# Patient Record
Sex: FEMALE | Race: WHITE | Employment: OTHER | URBAN - METROPOLITAN AREA
[De-identification: names, ages, dates, MRNs, and addresses within clinical notes are randomized per-mention and may not be internally consistent; named-entity substitution may affect disease eponyms.]

---

## 2017-01-20 DIAGNOSIS — G47.00 INSOMNIA, UNSPECIFIED TYPE: Primary | ICD-10-CM

## 2017-01-23 RX ORDER — TRAZODONE HYDROCHLORIDE 50 MG/1
50-150 TABLET, FILM COATED ORAL
Qty: 90 TABLET | Refills: 1 | Status: SHIPPED | OUTPATIENT
Start: 2017-01-23 | End: 2017-03-03

## 2017-01-23 NOTE — TELEPHONE ENCOUNTER
Prescription approved per [facility] Refill Protocol.  Marcelina Lucas RN    I did call patient and her request to let her know script sent.  I asked her to whenever possible to please call pharmacy for refills instead of clinic.  (I left this msg via Voice mail )      Last OV : 3/31/16

## 2017-02-21 DIAGNOSIS — E78.5 HYPERLIPIDEMIA LDL GOAL <70: ICD-10-CM

## 2017-02-21 NOTE — TELEPHONE ENCOUNTER
Reason for Call:  Medication or medication refill:    Do you use a [facility]?  Name of the pharmacy and phone number for the current request: Pharmacy: [facility] [address] [place] [address] AT [address] [Patient Preferred]  [phone]    Name of the medication requested: simvastatin (ZOCOR) 40 MG tablet    Other request: Pt is taking her last dose tonight    Can we leave a detailed message on this number? YES    Phone number patient can be reached at: Home number on file [phone] (home)    Best Time: anytime    Call taken on 2/21/2017 at 4:39 PM by Rekha Santillan

## 2017-02-22 RX ORDER — SIMVASTATIN 40 MG
40 TABLET ORAL AT BEDTIME
Qty: 30 TABLET | Refills: 0 | Status: SHIPPED | OUTPATIENT
Start: 2017-02-22 | End: 2017-04-06

## 2017-02-22 NOTE — TELEPHONE ENCOUNTER
simvastatin   LDL is due.  Ordered.  Filled for one month.  DOMINICK Pate    Last Written Prescription Date: 7/11/16  Last Fill Quantity: 30, # refills: 1  Last Office Visit with G, P or [facility] prescribing provider: 3/31/16       Lab Results   Component Value Date    CHOL 188 12/07/2015     Lab Results   Component Value Date    HDL 80 12/07/2015     Lab Results   Component Value Date    LDL 96 12/07/2015     Lab Results   Component Value Date    TRIG 62 12/07/2015     Lab Results   Component Value Date    CHOLHDLRATIO 1.7 08/06/2014       Informed pt and made lab appt.  DOMINICK Pate

## 2017-02-28 ENCOUNTER — ALLIED HEALTH/NURSE VISIT (OUTPATIENT)
Dept: NURSING | Facility: CLINIC | Age: 72
End: 2017-02-28
Payer: MEDICARE

## 2017-02-28 VITALS — BODY MASS INDEX: 17.67 KG/M2 | WEIGHT: 90.5 LBS

## 2017-02-28 DIAGNOSIS — E78.5 HYPERLIPIDEMIA LDL GOAL <70: ICD-10-CM

## 2017-02-28 DIAGNOSIS — R63.6 UNDERWEIGHT: Primary | ICD-10-CM

## 2017-02-28 PROCEDURE — 80061 LIPID PANEL: CPT | Performed by: FAMILY MEDICINE

## 2017-02-28 PROCEDURE — 99207 ZZC NO CHARGE NURSE ONLY: CPT

## 2017-02-28 PROCEDURE — 36415 COLL VENOUS BLD VENIPUNCTURE: CPT | Performed by: FAMILY MEDICINE

## 2017-02-28 NOTE — MR AVS SNAPSHOT
"              After Visit Summary   2/28/2017    Digna Montana    MRN: [id number]           Patient Information     Date Of Birth          1945        Visit Information        Provider Department      2/28/2017 2:00 PM  MEDICAL ASSISTANT [facility]        Today's Diagnoses     Underweight    -  1       Follow-ups after your visit        Your next 10 appointments already scheduled     Feb 28, 2017  2:00 PM CST   Nurse Only with  MEDICAL ASSISTANT   [facility] ([facility]    [address] [place] [id number] [phone]              Who to contact     If you have questions or need follow up information about today's clinic visit or your schedule please contact [facility] directly at [phone].  Normal or non-critical lab and imaging results will be communicated to you by MyChart, letter or phone within 4 business days after the clinic has received the results. If you do not hear from us within 7 days, please contact the clinic through MyChart or phone. If you have a critical or abnormal lab result, we will notify you by phone as soon as possible.  Submit refill requests through [employer] or call your pharmacy and they will forward the refill request to us. Please allow 3 business days for your refill to be completed.          Additional Information About Your Visit        MyChart Information     [employer] lets you send messages to your doctor, view your test results, renew your prescriptions, schedule appointments and more. To sign up, go to [email or web address] . Click on \"Log in\" on the left side of the screen, which will take you to the Welcome page. Then click on \"Sign up Now\" on the right side of the page.     You will be asked to enter the access code listed below, as well as some personal information. Please follow the directions to create your username and password.     Your access code is: [email or web address]  Expires: " 2017  1:29 PM     Your access code will  in 90 days. If you need help or a new code, please call your [facility] clinic or [phone].        Care EveryWhere ID     This is your Care EveryWhere ID. This could be used by other organizations to access your [facility] medical records  [id number]        Your Vitals Were     BMI (Body Mass Index)                   17.67 kg/m2            Blood Pressure from Last 3 Encounters:   16 134/72   12/05/15 137/89   09/01/15 124/71    Weight from Last 3 Encounters:   17 90 lb 8 oz (41.1 kg)   16 88 lb (39.9 kg)   12/05/15 95 lb (43.1 kg)              Today, you had the following     No orders found for display       Primary Care Provider Office Phone # Fax #    Simon Jesus Madden [phone]       [address] [address]        Thank you!     Thank you for choosing [facility]  for your care. Our goal is always to provide you with excellent care. Hearing back from our patients is one way we can continue to improve our services. Please take a few minutes to complete the written survey that you may receive in the mail after your visit with us. Thank you!             Your Updated Medication List - Protect others around you: Learn how to safely use, store and throw away your medicines at www.disposemymeds.org.          This list is accurate as of: 17  1:29 PM.  Always use your most recent med list.                   Brand Name Dispense Instructions for use    acetaminophen 325 MG tablet    TYLENOL    50 tablet    Take 1-2 tablets by mouth every 6 hours as needed.       ADVIL PO          * albuterol 108 (90 BASE) MCG/ACT Inhaler    PROAIR HFA/PROVENTIL HFA/VENTOLIN HFA    1 Inhaler    Inhale 2 puffs into the lungs every 4 hours as needed for shortness of breath / dyspnea       * albuterol (2.5 MG/3ML) 0.083% neb solution     1 Box    ONE NEBULIZATION 4 TIMES DAILY AS NEEDED        amLODIPine 5 MG tablet    NORVASC    90 tablet    Take 1 tablet (5 mg) by mouth daily       aspirin 325 MG EC tablet     100 tablet    Take 1 tablet (325 mg) by mouth daily       buPROPion 150 MG 12 hr tablet    BUPROBAN    180 tablet    Take 1 tablet (150 mg) by mouth 2 times daily       fluticasone 50 MCG/ACT spray    FLONASE    16 g    Spray 2 sprays into both nostrils daily       loratadine 10 MG tablet    CLARITIN    90 tablet    Take 1 tablet by mouth daily.       omeprazole 20 MG tablet     90 tablet    Take 1 tablet (20 mg) by mouth daily       simvastatin 40 MG tablet    ZOCOR    30 tablet    Take 1 tablet (40 mg) by mouth At Bedtime       SYMBICORT 160-4.5 MCG/ACT Inhaler   Generic drug:  budesonide-formoterol     3 Inhaler    Inhale 2 puffs into the lungs 2 times daily       traZODone 50 MG tablet    DESYREL    90 tablet    Take 1-3 tablets ( mg) by mouth nightly as needed for sleep       * Notice:  This list has 2 medication(s) that are the same as other medications prescribed for you. Read the directions carefully, and ask your doctor or other care provider to review them with you.

## 2017-02-28 NOTE — NURSING NOTE
Chief Complaint   Patient presents with     Allied Health Visit     wgt check       Initial Wt 90 lb 8 oz (41.1 kg)  BMI 17.67 kg/m2 Estimated body mass index is 17.67 kg/(m^2) as calculated from the following:    Height as of 12/5/15: 5' (1.524 m).    Weight as of this encounter: 90 lb 8 oz (41.1 kg).  Medication Reconciliation: unable or not appropriate to perform     Nou Felipe, CMA

## 2017-03-01 LAB
CHOLEST SERPL-MCNC: 187 MG/DL
HDLC SERPL-MCNC: 76 MG/DL
LDLC SERPL CALC-MCNC: 91 MG/DL
NONHDLC SERPL-MCNC: 111 MG/DL
TRIGL SERPL-MCNC: 98 MG/DL

## 2017-03-03 DIAGNOSIS — G47.00 INSOMNIA, UNSPECIFIED TYPE: ICD-10-CM

## 2017-03-03 NOTE — TELEPHONE ENCOUNTER
Medication Detail      Disp Refills Start End [facility]   traZODone (DESYREL) 50 MG tablet 90 tablet 1 1/23/2017  No   Sig: Take 1-3 tablets ( mg) by mouth nightly as needed for sleep       Last Office Visit with [facility], [facility] or [facility] prescribing provider:  6/21/2016        Last PHQ-9 score on record=   PHQ-9 SCORE 3/31/2016   Total Score -   Total Score 4       Lab Results   Component Value Date    AST 22 08/19/2015     Lab Results   Component Value Date    ALT 23 08/19/2015

## 2017-03-06 RX ORDER — TRAZODONE HYDROCHLORIDE 50 MG/1
TABLET, FILM COATED ORAL
Qty: 135 TABLET | Refills: 2 | Status: SHIPPED | OUTPATIENT
Start: 2017-03-06 | End: 2017-04-13

## 2017-03-16 ENCOUNTER — RADIANT APPOINTMENT (OUTPATIENT)
Dept: GENERAL RADIOLOGY | Facility: CLINIC | Age: 72
End: 2017-03-16
Attending: FAMILY MEDICINE
Payer: MEDICARE

## 2017-03-16 ENCOUNTER — OFFICE VISIT (OUTPATIENT)
Dept: FAMILY MEDICINE | Facility: CLINIC | Age: 72
End: 2017-03-16
Payer: MEDICARE

## 2017-03-16 VITALS
SYSTOLIC BLOOD PRESSURE: 118 MMHG | HEIGHT: 59 IN | OXYGEN SATURATION: 98 % | HEART RATE: 72 BPM | TEMPERATURE: 98.3 F | RESPIRATION RATE: 14 BRPM | WEIGHT: 89 LBS | DIASTOLIC BLOOD PRESSURE: 60 MMHG | BODY MASS INDEX: 17.94 KG/M2

## 2017-03-16 DIAGNOSIS — R05.9 COUGH: ICD-10-CM

## 2017-03-16 DIAGNOSIS — J44.1 CHRONIC OBSTRUCTIVE PULMONARY DISEASE WITH ACUTE EXACERBATION (H): Primary | ICD-10-CM

## 2017-03-16 DIAGNOSIS — F17.200 TOBACCO USE DISORDER: ICD-10-CM

## 2017-03-16 DIAGNOSIS — F10.29 ALCOHOL DEPENDENCE WITH UNSPECIFIED ALCOHOL-INDUCED DISORDER (H): ICD-10-CM

## 2017-03-16 DIAGNOSIS — I10 HYPERTENSION GOAL BP (BLOOD PRESSURE) < 140/90: ICD-10-CM

## 2017-03-16 PROCEDURE — 99214 OFFICE O/P EST MOD 30 MIN: CPT | Performed by: FAMILY MEDICINE

## 2017-03-16 PROCEDURE — 71020 XR CHEST 2 VW: CPT

## 2017-03-16 RX ORDER — AZITHROMYCIN 250 MG/1
TABLET, FILM COATED ORAL
Qty: 6 TABLET | Refills: 0 | Status: SHIPPED | OUTPATIENT
Start: 2017-03-16 | End: 2017-06-19

## 2017-03-16 RX ORDER — PREDNISONE 20 MG/1
40 TABLET ORAL DAILY
Qty: 10 TABLET | Refills: 0 | Status: SHIPPED | OUTPATIENT
Start: 2017-03-16 | End: 2017-03-21

## 2017-03-16 ASSESSMENT — ANXIETY QUESTIONNAIRES
1. FEELING NERVOUS, ANXIOUS, OR ON EDGE: NOT AT ALL
GAD7 TOTAL SCORE: 0
7. FEELING AFRAID AS IF SOMETHING AWFUL MIGHT HAPPEN: NOT AT ALL
2. NOT BEING ABLE TO STOP OR CONTROL WORRYING: NOT AT ALL
5. BEING SO RESTLESS THAT IT IS HARD TO SIT STILL: NOT AT ALL
3. WORRYING TOO MUCH ABOUT DIFFERENT THINGS: NOT AT ALL
6. BECOMING EASILY ANNOYED OR IRRITABLE: NOT AT ALL

## 2017-03-16 ASSESSMENT — PATIENT HEALTH QUESTIONNAIRE - PHQ9: 5. POOR APPETITE OR OVEREATING: NOT AT ALL

## 2017-03-16 NOTE — LETTER
[facility]   [address] [place] [address]  [phone]      March 22, 2017      Digna Montana  [address]   [facility] [address]          Dear Ms. Montana,    The results of your recent lab tests were within normal limits. Enclosed is a copy of these results.  If you have any further questions or problems, please contact our office.      Results for orders placed or performed in visit on 03/16/17   XR Chest 2 Views    Narrative    XR CHEST 2 VW 3/16/2017 10:55 AM    COMPARISON: 8/19/2015    HISTORY: Cough      Impression    IMPRESSION: Cardiac silhouette and pulmonary vasculature are within  normal limits. No focal airspace disease, pleural effusion or  pneumothorax.    GUILLERMINA PEREZ       Sincerely,      Adalgisa Tapia MD/nr

## 2017-03-16 NOTE — PROGRESS NOTES
SUBJECTIVE:                                                    Digna Montana is a 71 year old female who presents to clinic today for the following health issues:    Acute Illness   Acute illness concerns: pneumonia  Onset: 2-3 weeks     Fever: no    Chills/Sweats: no    Headache (location?): no    Sinus Pressure:YES- while coughing     Conjunctivitis:  no    Ear Pain: no    Rhinorrhea: no    Congestion: YES    Sore Throat: no     Cough: YES-productive of yellow sputum    Wheeze: no    Decreased Appetite: YES    Nausea: no    Vomiting: no    Diarrhea:  no    Dysuria/Freq.: no    Fatigue/Achiness: YES    Sick/Strep Exposure: no     Therapies Tried and outcome: Cough Syrup       Has been constantly coughing with coughing spells during the day. Generally has this cough once a year and has had pneumonia in the past. The cough is not worsening.  Denies fever. She has two inhalers and is using her albuterol more with the cough, offering relief.     She has cut back on smoking. Generally just takes drags off one cigarette through the am. Then goes outside to smoke in the afternoon. Really only smoking 3 cigarettes a day.    Patient has also cut back on drinking from her previous habits.       Problem list and histories reviewed & adjusted, as indicated.  Additional history: as documented    Patient Active Problem List   Diagnosis     Allergic rhinitis     Tobacco use disorder     Insomnia     Major depressive disorder, recurrent episode, moderate     Esophageal reflux     Generalized osteoarthrosis, unspecified site     COPD (chronic obstructive pulmonary disease) (H)     Stress reaction of bone     Back pain     Hiatal hernia     Advanced directives, counseling/discussion     Anxiety     CARDIOVASCULAR SCREENING; LDL GOAL LESS THAN 70     Acute right MCA stroke (H)     Intracranial vascular stenosis     Carotid disease, bilateral (H)     Mixed hyperlipidemia     Post herpetic neuralgia     Alcohol dependence (H)      Hypertension goal BP (blood pressure) < 140/90     Past Surgical History   Procedure Laterality Date     C nonspecific procedure            C nonspecific procedure       cholecystectomy     C nonspecific procedure       left distal radius fx       Social History   Substance Use Topics     Smoking status: Current Every Day Smoker     Types: Cigarettes     Smokeless tobacco: Current User      Comment: is using patch/quit plan     Alcohol use Yes      Comment: 2 drinks 3 times weekly     Family History   Problem Relation Age of Onset     GASTROINTESTINAL DISEASE Mother      pancreatitis     C.A.D. Father      mi in 70's     Genetic Disorder Sister      downs     DIABETES No family hx of      Hypertension No family hx of      CEREBROVASCULAR DISEASE No family hx of      Breast Cancer No family hx of      Cancer - colorectal No family hx of      Prostate Cancer No family hx of          Current Outpatient Prescriptions   Medication Sig Dispense Refill     traZODone (DESYREL) 50 MG tablet TAKE 1 TO 3 TABLETS(50  MG) BY MOUTH EVERY NIGHT AS NEEDED FOR SLEEP 135 tablet 2     simvastatin (ZOCOR) 40 MG tablet Take 1 tablet (40 mg) by mouth At Bedtime 30 tablet 0     buPROPion (BUPROBAN) 150 MG 12 hr tablet Take 1 tablet (150 mg) by mouth 2 times daily 180 tablet 0     amLODIPine (NORVASC) 5 MG tablet Take 1 tablet (5 mg) by mouth daily 90 tablet 3     fluticasone (FLONASE) 50 MCG/ACT nasal spray Spray 2 sprays into both nostrils daily 16 g 6     Ibuprofen (ADVIL PO)        albuterol (PROAIR HFA, PROVENTIL HFA, VENTOLIN HFA) 108 (90 BASE) MCG/ACT inhaler Inhale 2 puffs into the lungs every 4 hours as needed for shortness of breath / dyspnea 1 Inhaler 2     albuterol (2.5 MG/3ML) 0.083% nebulizer solution ONE NEBULIZATION 4 TIMES DAILY AS NEEDED 1 Box 0     omeprazole 20 MG tablet Take 1 tablet (20 mg) by mouth daily 90 tablet 2     aspirin 325 MG EC tablet Take 1 tablet (325 mg) by mouth daily 100 tablet  1     budesonide-formoterol (SYMBICORT) 160-4.5 MCG/ACT inhaler Inhale 2 puffs into the lungs 2 times daily 3 Inhaler 1     loratadine (CLARITIN) 10 MG tablet Take 1 tablet by mouth daily.    90 tablet 3     acetaminophen (TYLENOL) 325 MG tablet Take 1-2 tablets by mouth every 6 hours as needed. 50 tablet prn     Allergies   Allergen Reactions     Penicillins      Throat swelled up      Recent Labs   Lab Test  02/28/17   1133  12/07/15   0747  08/19/15   1416  08/28/14   1030  08/06/14   1147  12/21/13   1723   02/15/12   1205   A1C   --    --    --    --    --   5.2   --    --    LDL  91  96   --    --   44  132*   --    --    HDL  76  80   --    --   79  87   --    --    TRIG  98  62   --    --   54  128   --    --    ALT   --    --   23   --   13   --    --   18   CR   --    --   0.70  0.66  0.76  0.66   < >  0.79   GFRESTIMATED   --    --   82  88  76  89   < >  73   GFRESTBLACK   --    --   >90   GFR Calc    >90   GFR Calc    >90   GFR Calc    >90   < >  88   POTASSIUM   --    --   3.8   --   4.5  3.1*   < >  3.8    < > = values in this interval not displayed.      BP Readings from Last 3 Encounters:   03/16/17 118/60   03/31/16 134/72   12/05/15 137/89    Wt Readings from Last 3 Encounters:   03/16/17 40.4 kg (89 lb)   02/28/17 41.1 kg (90 lb 8 oz)   03/31/16 39.9 kg (88 lb)         Reviewed and updated as needed this visit by clinical staff  Reviewed and updated as needed this visit by Provider    ROS:  SOB following coughing spell. See above.     This document serves as a record of the services and decisions personally performed and made by Adalgisa Tapia MD. It was created on his/her behalf by Shirin Coronado, trained medical scribe. The creation of this document is based the provider's statements to the medical scribes.    Scribe Shirin Coronado, March 16, 2017  OBJECTIVE:                                                    /60  Pulse 72  Temp 98.3  F  "(36.8  C) (Oral)  Resp 14  Ht 1.486 m (4' 10.5\")  Wt 40.4 kg (89 lb)  SpO2 98%  BMI 18.28 kg/m2  Body mass index is 18.28 kg/(m^2).  GENERAL: alert and no distress  HENT: ear canals and TM's normal, nose and mouth without ulcers or lesions  NECK: no adenopathy, no asymmetry, masses, or scars and thyroid normal to palpation  RESP: lungs clear to auscultation - no rales or rhonchi. Some wheezes  CV: regular rate and rhythm, normal S1 S2, no S3 or S4, no murmur, click or rub    Diagnostic Test Results:  Chest XR - no pneumonia seen upon my initial read, awaiting radiology overread      ASSESSMENT/PLAN:                                                    1. Chronic obstructive pulmonary disease with acute exacerbation (H)  Prescribed azithromycin and prednisone for COPD flare.   - azithromycin (ZITHROMAX) 250 MG tablet; Two tablets first day, then one tablet daily for four days.  Dispense: 6 tablet; Refill: 0  - predniSONE (DELTASONE) 20 MG tablet; Take 2 tablets (40 mg) by mouth daily for 5 days  Dispense: 10 tablet; Refill: 0    2. Cough  No evidence of pneumonia seen upon my initial read, awaiting radiology overread. She continues to use her albuterol and symbicort inhalers PRN with the cough.   - XR Chest 2 Views; Future    3. Alcohol dependence with unspecified alcohol-induced disorder (H)  She has cut back on drinking.    4. Tobacco use disorder  Not interested in quitting but has cut back to roughly three cigarettes a day.    5. Hypertension goal BP (blood pressure) < 140/90  Controlled. No changes today       Patient Instructions     1. Start azithromycin and prednisone for five days.  2. Call if symptoms are getting worse or not getting better.    COPD Flare    You have had a flare-up of your COPD.  COPD, or chronic obstructive pulmonary disease, is a common lung disease. It causes your airways to become irritated and narrower. This makes it harder for you to breathe. Emphysema and chronic bronchitis are both " types of COPD. This is a chronic condition, which means you always have it. Sometimes it gets worse. When this happens, it is called a flare-up.  Symptoms of COPD  People with COPD may have symptoms most of the time. In a flare-up, your symptoms get worse. These symptoms may mean you are having a flare-up:    Shortness of breath, shallow or rapid breathing, or wheezing that gets worse    Lung infection    Cough that gets worse    More mucus, thicker mucus or mucus of a different color    Tiredness, decreased energy, or trouble doing your usual activities    Fever    Chest tightness    Your symptoms don t get better even when you use your usual medicines, inhalers, and nebulizer    Trouble talking    You feel confused  Causes of flare-ups  Unfortunately, a flare-up can happen even though you did everything right, and you followed your doctor s instructions. Some causes of flare-ups are:    Smoking or secondhand smoke    Colds, the flu, or respiratory infections    Air pollution    Sudden change in the weather    Dust, irritating chemicals, or strong fumes    Not taking your medicines as prescribed  Home care  Here are some things you can do at home to treat a flare-up:    Try not to panic. This makes it harder to breathe, and keeps you from doing the right things.    Don t smoke or be around others who are smoking.    Try to drink more fluids than usual during a flare-up, unless your doctor has told you not to because of heart and kidney problems. More fluids can help loosen the mucus.    Use your inhalers and nebulizer, if you have one, as you have been told to.    If you were given antibiotics, take them until they are used up or your doctor tells you to stop. It s important to finish the antibiotics, even though you feel better. This will make sure the infection has cleared.    If you were given prednisone or another steroid, finish it even if you feel better.  Preventing a flare-up  Even though flare-ups happen,  the best way to treat one is to prevent it before it starts. Here are some pointers:    Don t smoke or be around others who are smoking.    Take your medicines as you have been told.    Talk with your doctor about getting a flu shot every year. Also find out if you need a pneumonia shot.    If there is a weather advisory warning to stay indoors, try to stay inside when possible.    Try to eat healthy and get plenty of sleep.    Try to avoid things that usually set you off, like dust, chemical fumes, hairsprays, or strong perfumes.  Follow-up care  Follow up with your healthcare provider.  If a culture was done, you will be told if your treatment needs to be changed. You can call as directed for the results.  If X-rays were done, and a radiologist had not seen them while you were there, they will be reviewed. You will be told if there is a change in the reading, especially if it affects your treatment.  Call 911  Call 911 if any of these occur:    You have trouble breathing    You feel confused or it s difficult to wake you up    You faint or lose consciousness    You have a rapid heart rate    You have new pain in your chest, arm, shoulder, neck or upper back  When to seek medical advice  Call your healthcare provider right away if any of these occur:    Wheezing or shortness of breath gets worse    You need to use your inhalers more often than usual without relief    Fever of 100.4 F (38 C) or higher, or as directed    Coughing up lots of dark-colored or bloody sputum (mucus)    Chest pain with each breath    You do not start to get better within 24 hours    Swelling or your ankles gets worse    Dizziness or weakness       1493-7964 [employer]. [address] [place] [address], [place] [address]. All rights reserved. This information is not intended as a substitute for professional medical care. Always follow your healthcare professional's instructions.            The information in this document, created by  the medical scribe for me, accurately reflects the services I personally performed and the decisions made by me. I have reviewed and approved this document for accuracy prior to leaving the patient care area. 03/16/17    Adalgisa Tapia MD  [facility]

## 2017-03-16 NOTE — MR AVS SNAPSHOT
After Visit Summary   3/16/2017    Digna Montana    MRN: [id number]           Patient Information     Date Of Birth          1945        Visit Information        Provider Department      3/16/2017 10:00 AM Adalgisa Tapia MD [facility]        Today's Diagnoses     Chronic obstructive pulmonary disease with acute exacerbation (H)    -  1    Cough        Alcohol dependence with unspecified alcohol-induced disorder (H)        Tobacco use disorder        Hypertension goal BP (blood pressure) < 140/90          Care Instructions      1. Start azithromycin and prednisone for five days.  2. Call if symptoms are getting worse or not getting better.    COPD Flare    You have had a flare-up of your COPD.  COPD, or chronic obstructive pulmonary disease, is a common lung disease. It causes your airways to become irritated and narrower. This makes it harder for you to breathe. Emphysema and chronic bronchitis are both types of COPD. This is a chronic condition, which means you always have it. Sometimes it gets worse. When this happens, it is called a flare-up.  Symptoms of COPD  People with COPD may have symptoms most of the time. In a flare-up, your symptoms get worse. These symptoms may mean you are having a flare-up:    Shortness of breath, shallow or rapid breathing, or wheezing that gets worse    Lung infection    Cough that gets worse    More mucus, thicker mucus or mucus of a different color    Tiredness, decreased energy, or trouble doing your usual activities    Fever    Chest tightness    Your symptoms don t get better even when you use your usual medicines, inhalers, and nebulizer    Trouble talking    You feel confused  Causes of flare-ups  Unfortunately, a flare-up can happen even though you did everything right, and you followed your doctor s instructions. Some causes of flare-ups are:    Smoking or secondhand smoke    Colds, the flu, or respiratory infections    Air  pollution    Sudden change in the weather    Dust, irritating chemicals, or strong fumes    Not taking your medicines as prescribed  Home care  Here are some things you can do at home to treat a flare-up:    Try not to panic. This makes it harder to breathe, and keeps you from doing the right things.    Don t smoke or be around others who are smoking.    Try to drink more fluids than usual during a flare-up, unless your doctor has told you not to because of heart and kidney problems. More fluids can help loosen the mucus.    Use your inhalers and nebulizer, if you have one, as you have been told to.    If you were given antibiotics, take them until they are used up or your doctor tells you to stop. It s important to finish the antibiotics, even though you feel better. This will make sure the infection has cleared.    If you were given prednisone or another steroid, finish it even if you feel better.  Preventing a flare-up  Even though flare-ups happen, the best way to treat one is to prevent it before it starts. Here are some pointers:    Don t smoke or be around others who are smoking.    Take your medicines as you have been told.    Talk with your doctor about getting a flu shot every year. Also find out if you need a pneumonia shot.    If there is a weather advisory warning to stay indoors, try to stay inside when possible.    Try to eat healthy and get plenty of sleep.    Try to avoid things that usually set you off, like dust, chemical fumes, hairsprays, or strong perfumes.  Follow-up care  Follow up with your healthcare provider.  If a culture was done, you will be told if your treatment needs to be changed. You can call as directed for the results.  If X-rays were done, and a radiologist had not seen them while you were there, they will be reviewed. You will be told if there is a change in the reading, especially if it affects your treatment.  Call 911  Call 911 if any of these occur:    You have trouble  "breathing    You feel confused or it s difficult to wake you up    You faint or lose consciousness    You have a rapid heart rate    You have new pain in your chest, arm, shoulder, neck or upper back  When to seek medical advice  Call your healthcare provider right away if any of these occur:    Wheezing or shortness of breath gets worse    You need to use your inhalers more often than usual without relief    Fever of 100.4 F (38 C) or higher, or as directed    Coughing up lots of dark-colored or bloody sputum (mucus)    Chest pain with each breath    You do not start to get better within 24 hours    Swelling or your ankles gets worse    Dizziness or weakness       6490-7638 [employer] [address] [place] [address]. All rights reserved. This information is not intended as a substitute for professional medical care. Always follow your healthcare professional's instructions.              Follow-ups after your visit        Who to contact     If you have questions or need follow up information about today's clinic visit or your schedule please contact [facility] directly at [phone].  Normal or non-critical lab and imaging results will be communicated to you by Playbasishart, letter or phone within 4 business days after the clinic has received the results. If you do not hear from us within 7 days, please contact the clinic through NoteVaultt or phone. If you have a critical or abnormal lab result, we will notify you by phone as soon as possible.  Submit refill requests through Solutionary or call your pharmacy and they will forward the refill request to us. Please allow 3 business days for your refill to be completed.          Additional Information About Your Visit        Solutionary Information     Solutionary lets you send messages to your doctor, view your test results, renew your prescriptions, schedule appointments and more. To sign up, go to [email or web address] . Click on \"Log in\" " "on the left side of the screen, which will take you to the Welcome page. Then click on \"Sign up Now\" on the right side of the page.     You will be asked to enter the access code listed below, as well as some personal information. Please follow the directions to create your username and password.     Your access code is: [email or web address]  Expires: 2017  2:29 PM     Your access code will  in 90 days. If you need help or a new code, please call your [facility] or [phone].        Care EveryWhere ID     This is your Care EveryWhere ID. This could be used by other organizations to access your [facility] medical records  [id number]        Your Vitals Were     Pulse Temperature Respirations Height Pulse Oximetry BMI (Body Mass Index)    72 98.3  F (36.8  C) (Oral) 14 4' 10.5\" (1.486 m) 98% 18.28 kg/m2       Blood Pressure from Last 3 Encounters:   17 118/60   16 134/72   12/05/15 137/89    Weight from Last 3 Encounters:   17 89 lb (40.4 kg)   17 90 lb 8 oz (41.1 kg)   16 88 lb (39.9 kg)                 Today's Medication Changes          These changes are accurate as of: 3/16/17 11:08 AM.  If you have any questions, ask your nurse or doctor.               Start taking these medicines.        Dose/Directions    azithromycin 250 MG tablet   Commonly known as:  ZITHROMAX   Used for:  Chronic obstructive pulmonary disease with acute exacerbation (H)   Started by:  Adalgisa Tapia MD        Two tablets first day, then one tablet daily for four days.   Quantity:  6 tablet   Refills:  0       predniSONE 20 MG tablet   Commonly known as:  DELTASONE   Used for:  Chronic obstructive pulmonary disease with acute exacerbation (H)   Started by:  Adalgisa Tapia MD        Dose:  40 mg   Take 2 tablets (40 mg) by mouth daily for 5 days   Quantity:  10 tablet   Refills:  0            Where to get your medicines      These medications were sent to [facility] [address] [place] [address] "  [address] [place] [address] [address] [facility] [address]     Phone:  [phone]     azithromycin 250 MG tablet    predniSONE 20 MG tablet                Primary Care Provider Office Phone # Fax #    Simon Jesus Madden [phone]       [facility] [address] [facility] [address]        Thank you!     Thank you for choosing [facility]  for your care. Our goal is always to provide you with excellent care. Hearing back from our patients is one way we can continue to improve our services. Please take a few minutes to complete the written survey that you may receive in the mail after your visit with us. Thank you!             Your Updated Medication List - Protect others around you: Learn how to safely use, store and throw away your medicines at www.disposemymeds.org.          This list is accurate as of: 3/16/17 11:08 AM.  Always use your most recent med list.                   Brand Name Dispense Instructions for use    acetaminophen 325 MG tablet    TYLENOL    50 tablet    Take 1-2 tablets by mouth every 6 hours as needed.       ADVIL PO          * albuterol 108 (90 BASE) MCG/ACT Inhaler    PROAIR HFA/PROVENTIL HFA/VENTOLIN HFA    1 Inhaler    Inhale 2 puffs into the lungs every 4 hours as needed for shortness of breath / dyspnea       * albuterol (2.5 MG/3ML) 0.083% neb solution     1 Box    ONE NEBULIZATION 4 TIMES DAILY AS NEEDED       amLODIPine 5 MG tablet    NORVASC    90 tablet    Take 1 tablet (5 mg) by mouth daily       aspirin 325 MG EC tablet     100 tablet    Take 1 tablet (325 mg) by mouth daily       azithromycin 250 MG tablet    ZITHROMAX    6 tablet    Two tablets first day, then one tablet daily for four days.       buPROPion 150 MG 12 hr tablet    BUPROBAN    180 tablet    Take 1 tablet (150 mg) by mouth 2 times daily       fluticasone 50 MCG/ACT spray    FLONASE    16 g    Spray 2 sprays into both nostrils daily        loratadine 10 MG tablet    CLARITIN    90 tablet    Take 1 tablet by mouth daily.       omeprazole 20 MG tablet     90 tablet    Take 1 tablet (20 mg) by mouth daily       predniSONE 20 MG tablet    DELTASONE    10 tablet    Take 2 tablets (40 mg) by mouth daily for 5 days       simvastatin 40 MG tablet    ZOCOR    30 tablet    Take 1 tablet (40 mg) by mouth At Bedtime       SYMBICORT 160-4.5 MCG/ACT Inhaler   Generic drug:  budesonide-formoterol     3 Inhaler    Inhale 2 puffs into the lungs 2 times daily       traZODone 50 MG tablet    DESYREL    135 tablet    TAKE 1 TO 3 TABLETS(50  MG) BY MOUTH EVERY NIGHT AS NEEDED FOR SLEEP       * Notice:  This list has 2 medication(s) that are the same as other medications prescribed for you. Read the directions carefully, and ask your doctor or other care provider to review them with you.

## 2017-03-16 NOTE — PATIENT INSTRUCTIONS
1. Start azithromycin and prednisone for five days.  2. Call if symptoms are getting worse or not getting better.    COPD Flare    You have had a flare-up of your COPD.  COPD, or chronic obstructive pulmonary disease, is a common lung disease. It causes your airways to become irritated and narrower. This makes it harder for you to breathe. Emphysema and chronic bronchitis are both types of COPD. This is a chronic condition, which means you always have it. Sometimes it gets worse. When this happens, it is called a flare-up.  Symptoms of COPD  People with COPD may have symptoms most of the time. In a flare-up, your symptoms get worse. These symptoms may mean you are having a flare-up:    Shortness of breath, shallow or rapid breathing, or wheezing that gets worse    Lung infection    Cough that gets worse    More mucus, thicker mucus or mucus of a different color    Tiredness, decreased energy, or trouble doing your usual activities    Fever    Chest tightness    Your symptoms don t get better even when you use your usual medicines, inhalers, and nebulizer    Trouble talking    You feel confused  Causes of flare-ups  Unfortunately, a flare-up can happen even though you did everything right, and you followed your doctor s instructions. Some causes of flare-ups are:    Smoking or secondhand smoke    Colds, the flu, or respiratory infections    Air pollution    Sudden change in the weather    Dust, irritating chemicals, or strong fumes    Not taking your medicines as prescribed  Home care  Here are some things you can do at home to treat a flare-up:    Try not to panic. This makes it harder to breathe, and keeps you from doing the right things.    Don t smoke or be around others who are smoking.    Try to drink more fluids than usual during a flare-up, unless your doctor has told you not to because of heart and kidney problems. More fluids can help loosen the mucus.    Use your inhalers and nebulizer, if you have one,  as you have been told to.    If you were given antibiotics, take them until they are used up or your doctor tells you to stop. It s important to finish the antibiotics, even though you feel better. This will make sure the infection has cleared.    If you were given prednisone or another steroid, finish it even if you feel better.  Preventing a flare-up  Even though flare-ups happen, the best way to treat one is to prevent it before it starts. Here are some pointers:    Don t smoke or be around others who are smoking.    Take your medicines as you have been told.    Talk with your doctor about getting a flu shot every year. Also find out if you need a pneumonia shot.    If there is a weather advisory warning to stay indoors, try to stay inside when possible.    Try to eat healthy and get plenty of sleep.    Try to avoid things that usually set you off, like dust, chemical fumes, hairsprays, or strong perfumes.  Follow-up care  Follow up with your healthcare provider.  If a culture was done, you will be told if your treatment needs to be changed. You can call as directed for the results.  If X-rays were done, and a radiologist had not seen them while you were there, they will be reviewed. You will be told if there is a change in the reading, especially if it affects your treatment.  Call 911  Call 911 if any of these occur:    You have trouble breathing    You feel confused or it s difficult to wake you up    You faint or lose consciousness    You have a rapid heart rate    You have new pain in your chest, arm, shoulder, neck or upper back  When to seek medical advice  Call your healthcare provider right away if any of these occur:    Wheezing or shortness of breath gets worse    You need to use your inhalers more often than usual without relief    Fever of 100.4 F (38 C) or higher, or as directed    Coughing up lots of dark-colored or bloody sputum (mucus)    Chest pain with each breath    You do not start to get  better within 24 hours    Swelling or your ankles gets worse    Dizziness or weakness       3106-7646 [employer]. [address] [place] [address], [place] [address]. All rights reserved. This information is not intended as a substitute for professional medical care. Always follow your healthcare professional's instructions.

## 2017-03-16 NOTE — NURSING NOTE
"Chief Complaint   Patient presents with     Cough       Initial /60  Pulse 72  Temp 98.3  F (36.8  C) (Oral)  Resp 14  Ht 4' 10.5\" (1.486 m)  Wt 89 lb (40.4 kg)  SpO2 98%  BMI 18.28 kg/m2 Estimated body mass index is 18.28 kg/(m^2) as calculated from the following:    Height as of this encounter: 4' 10.5\" (1.486 m).    Weight as of this encounter: 89 lb (40.4 kg).  Medication Reconciliation: complete       Anitha Rausch MA     "

## 2017-03-16 NOTE — LETTER
My Depression Action Plan  Name: Digna Montana   Date of Birth 1945  Date: 3/16/2017    My doctor: Simon Madden   My clinic: [address] [address] [phone]          GREEN    ZONE   Good Control    What it looks like:     Things are going generally well. You have normal up s and down s. You may even feel depressed from time to time, but bad moods usually last less than a day.   What you need to do:  1. Continue to care for yourself (see self care plan)  2. Check your depression survival kit and update it as needed  3. Follow your physician s recommendations including any medication.  4. Do not stop taking medication unless you consult with your physician first.           YELLOW         ZONE Getting Worse    What it looks like:     Depression is starting to interfere with your life.     It may be hard to get out of bed; you may be starting to isolate yourself from others.    Symptoms of depression are starting to last most all day and this has happened for several days.     You may have suicidal thoughts but they are not constant.   What you need to do:     1. Call your care team, your response to treatment will improve if you keep your care team informed of your progress. Yellow periods are signs an adjustment may need to be made.     2. Continue your self-care, even if you have to fake it!    3. Talk to someone in your support network    4. Open up your depression survival kit           RED    ZONE Medical Alert - Get Help    What it looks like:     Depression is seriously interfering with your life.     You may experience these or other symptoms: You can t get out of bed most days, can t work or engage in other necessary activities, you have trouble taking care of basic hygiene, or basic responsibilities, thoughts of suicide or death that will not go away, self-injurious behavior.     What you need to do:  1. Call your  care team and request a same-day appointment. If they are not available (weekends or after hours) call your local crisis line, emergency room or 911.      Electronically signed by: JOSIAH JACOBS, March 16, 2017    Depression Self Care Plan / Survival Kit    Self-Care for Depression  Here s the deal. Your body and mind are really not as separate as most people think.  What you do and think affects how you feel and how you feel influences what you do and think. This means if you do things that people who feel good do, it will help you feel better.  Sometimes this is all it takes.  There is also a place for medication and therapy depending on how severe your depression is, so be sure to consult with your medical provider and/ or Behavioral Health Consultant if your symptoms are worsening or not improving.     In order to better manage my stress, I will:    Exercise  Get some form of exercise, every day. This will help reduce pain and release endorphins, the  feel good  chemicals in your brain. This is almost as good as taking antidepressants!  This is not the same as joining a gym and then never going! (they count on that by the way ) It can be as simple as just going for a walk or doing some gardening, anything that will get you moving.      Hygiene   Maintain good hygiene (Get out of bed in the morning, Make your bed, Brush your teeth, Take a shower, and Get dressed like you were going to work, even if you are unemployed).  If your clothes don't fit try to get ones that do.    Diet  I will strive to eat foods that are good for me, drink plenty of water, and avoid excessive sugar, caffeine, alcohol, and other mood-altering substances.  Some foods that are helpful in depression are: complex carbohydrates, B vitamins, flaxseed, fish or fish oil, fresh fruits and vegetables.    Psychotherapy  I agree to participate in Individual Therapy (if recommended).    Medication  If prescribed medications, I agree to take  them.  Missing doses can result in serious side effects.  I understand that drinking alcohol, or other illicit drug use, may cause potential side effects.  I will not stop my medication abruptly without first discussing it with my provider.    Staying Connected With Others  I will stay in touch with my friends, family members, and my primary care provider/team.    Use your imagination  Be creative.  We all have a creative side; it doesn t matter if it s oil painting, sand castles, or mud pies! This will also kick up the endorphins.    Witness Beauty  (AKA stop and smell the roses) Take a look outside, even in mid-winter. Notice colors, textures. Watch the squirrels and birds.     Service to others  Be of service to others.  There is always someone else in need.  By helping others we can  get out of ourselves  and remember the really important things.  This also provides opportunities for practicing all the other parts of the program.    Humor  Laugh and be silly!  Adjust your TV habits for less news and crime-drama and more comedy.    Control your stress  Try breathing deep, massage therapy, biofeedback, and meditation. Find time to relax each day.     My support system    Clinic Contact:  Phone number:    Contact 1:  Phone number:    Contact 2:  Phone number:    Jainism/:  Phone number:    Therapist:  Phone number:    Local crisis center:    Phone number:    Other community support:  Phone number:

## 2017-03-17 ASSESSMENT — PATIENT HEALTH QUESTIONNAIRE - PHQ9: SUM OF ALL RESPONSES TO PHQ QUESTIONS 1-9: 0

## 2017-03-17 ASSESSMENT — ANXIETY QUESTIONNAIRES: GAD7 TOTAL SCORE: 0

## 2017-03-31 DIAGNOSIS — I10 HYPERTENSION GOAL BP (BLOOD PRESSURE) < 140/90: ICD-10-CM

## 2017-03-31 RX ORDER — AMLODIPINE BESYLATE 5 MG/1
TABLET ORAL
Qty: 90 TABLET | Refills: 3 | Status: SHIPPED | OUTPATIENT
Start: 2017-03-31 | End: 2017-12-12

## 2017-03-31 NOTE — TELEPHONE ENCOUNTER
BP Readings from Last 3 Encounters:   03/16/17 118/60   03/31/16 134/72   12/05/15 137/89       Refilled per [facility] refill policy.    Cathie Felix RN

## 2017-04-06 DIAGNOSIS — G47.00 INSOMNIA, UNSPECIFIED TYPE: ICD-10-CM

## 2017-04-06 DIAGNOSIS — E78.5 HYPERLIPIDEMIA LDL GOAL <70: ICD-10-CM

## 2017-04-06 NOTE — TELEPHONE ENCOUNTER
traZODone (DESYREL) 50 MG tablet       Last Written Prescription Date: 3/6/17  Last Fill Quantity: 135; # refills: 2  Last [facility] with [facility] or  Health prescribing provider:  3/16/17        Last PHQ-9 score on record=   PHQ-9 SCORE 3/16/2017   Total Score -   Total Score 0       Lab Results   Component Value Date    AST 22 08/19/2015     Lab Results   Component Value Date    ALT 23 08/19/2015       simvastatin (ZOCOR) 40 MG tablet     Last Written Prescription Date: 2/22/17  Last Fill Quantity: 30, # refills: 0  Last Office Visit with [facility] or  Health prescribing provider: 3/16/17       Lab Results   Component Value Date    CHOL 187 02/28/2017     Lab Results   Component Value Date    HDL 76 02/28/2017     Lab Results   Component Value Date    LDL 91 02/28/2017     Lab Results   Component Value Date    TRIG 98 02/28/2017     Lab Results   Component Value Date    CHOLHDLRATIO 1.7 08/06/2014

## 2017-04-07 RX ORDER — SIMVASTATIN 40 MG
TABLET ORAL
Qty: 90 TABLET | Refills: 2 | Status: SHIPPED | OUTPATIENT
Start: 2017-04-07 | End: 2017-11-14

## 2017-04-07 RX ORDER — TRAZODONE HYDROCHLORIDE 50 MG/1
TABLET, FILM COATED ORAL
Qty: 90 TABLET | Refills: 0 | OUTPATIENT
Start: 2017-04-07

## 2017-04-07 NOTE — TELEPHONE ENCOUNTER
Trazodone should have refills available.    Routing refill request to provider for review/approval because:  Labs out of range:  LDL    Routing to PCP.    Dr. Madden-Please sign if agree.    Thank you!  RIO Whitley, KIRILLN, RN

## 2017-04-13 DIAGNOSIS — G47.00 INSOMNIA, UNSPECIFIED TYPE: ICD-10-CM

## 2017-04-13 NOTE — TELEPHONE ENCOUNTER
traZODone (DESYREL) 50 MG tablet       Last Written Prescription Date: 3/6/17  Last Fill Quantity: 135; # refills: 2  Last Office Visit with [facility], [facility] or [facility] prescribing provider:  3/16/17        Last PHQ-9 score on record=   PHQ-9 SCORE 3/16/2017   Total Score -   Total Score 0       Lab Results   Component Value Date    AST 22 08/19/2015     Lab Results   Component Value Date    ALT 23 08/19/2015     Pt requests 90 day supply of medication

## 2017-04-14 ENCOUNTER — TELEPHONE (OUTPATIENT)
Dept: SURGERY | Facility: CLINIC | Age: 72
End: 2017-04-14

## 2017-04-14 NOTE — TELEPHONE ENCOUNTER
Per task, I called Digna and she said that she is not having any problems with her colon. She does not want to schedule anything at this time and is not even aware of where Dr. Charles is anymore. She asked me to send a letter with Dr. Charles's contact information for when she has problems.

## 2017-04-18 RX ORDER — TRAZODONE HYDROCHLORIDE 50 MG/1
TABLET, FILM COATED ORAL
Qty: 270 TABLET | Refills: 3 | Status: SHIPPED | OUTPATIENT
Start: 2017-04-18 | End: 2017-11-14

## 2017-04-18 NOTE — TELEPHONE ENCOUNTER
Prescription approved per [facility] [facility].  DOMINICK Pate

## 2017-05-31 DIAGNOSIS — F17.200 TOBACCO USE DISORDER: ICD-10-CM

## 2017-06-01 RX ORDER — BUPROPION HYDROCHLORIDE 150 MG/1
TABLET, EXTENDED RELEASE ORAL
Qty: 180 TABLET | Refills: 0 | Status: SHIPPED | OUTPATIENT
Start: 2017-06-01 | End: 2017-11-14

## 2017-06-01 NOTE — TELEPHONE ENCOUNTER
Medication Detail      Disp Refills Start End HILTON   buPROPion (BUPROBAN) 150 MG 12 hr tablet 180 tablet 0 11/25/2016  No   Sig: Take 1 tablet (150 mg) by mouth 2 times daily       Last Office Visit with [facility], [facility] or [facility] prescribing provider:  3/16/17        Last PHQ-9 score on record=   PHQ-9 SCORE 3/16/2017   Total Score -   Total Score 0       Lab Results   Component Value Date    AST 22 08/19/2015     Lab Results   Component Value Date    ALT 23 08/19/2015

## 2017-06-01 NOTE — TELEPHONE ENCOUNTER
Medication prescribed for tobacco use disorder.    BP Readings from Last 6 Encounters:   03/16/17 118/60   03/31/16 134/72   12/05/15 137/89   09/01/15 124/71   08/19/15 120/68   06/08/15 104/66     Prescription approved per [facility].  KIRILL RollinsN, RN

## 2017-06-16 DIAGNOSIS — J44.1 CHRONIC OBSTRUCTIVE PULMONARY DISEASE WITH ACUTE EXACERBATION (H): ICD-10-CM

## 2017-06-16 RX ORDER — PREDNISONE 20 MG/1
40 TABLET ORAL DAILY
Qty: 10 TABLET | Refills: 0 | OUTPATIENT
Start: 2017-06-16

## 2017-06-16 RX ORDER — AZITHROMYCIN 250 MG/1
TABLET, FILM COATED ORAL
Qty: 6 TABLET | Refills: 0 | OUTPATIENT
Start: 2017-06-16

## 2017-06-16 NOTE — TELEPHONE ENCOUNTER
"Patient calling and requesting refill of:    azithromycin (ZITHROMAX) 250 MG tablet  Last filled 3/16/2017     predniSONE (DELTASONE) 20 MG tablet; Take 2 tablets (40 mg) by (from 3/16/2017)    Patient states she is having COPD flare from allergies. Some increased SOB but nothing emergent. Patient states this \"always \" happens this time of year.    Do you agree with refill?    Thanks! Marina Redd RN      Routing to RN [place] to call patient when refilled- Patient requested call back    Thanks! Marina Redd RN      "

## 2017-06-16 NOTE — TELEPHONE ENCOUNTER
This relayed to pt. Appt made for her in clinic on 6/19  If symptoms worsen or change to call back. If after hours to UC/ER.  Cathie Felix RN

## 2017-06-16 NOTE — TELEPHONE ENCOUNTER
Denied. Office visit required - we do not recommend antibiotics and prednisone without seen.   If cant wait for weekend - go to urgent care.

## 2017-06-18 NOTE — PROGRESS NOTES
SUBJECTIVE:                                                    Digna Montana is a 71 year old female who presents to clinic today for the following health issues:        COPD Follow-Up    Symptoms are currently: stable, no energy    Current fatigue or dyspnea with ambulation: stable     Shortness of breath: stable    Increased or change in Cough/Sputum: Yes-  More cough - feels like she has phlegm that she can't clear    Baseline ambulation without stopping to rest: can walk around her block slowly but without stopping.  Does not climb any stairs    Any ER/UC or hospital admissions since your last visit? No     She continues to use symbicort and albuterol.      Has wellbutrin prescribed for smoking cessation but is unsure if she is taking that.      History   Smoking Status     Current Every Day Smoker     Types: Cigarettes   Smokeless Tobacco     Current User     Comment: is using patch/quit plan     No results found for: FEV1, YYT1ROT     She'd also like a refill of flexeril.  She uses this periodically for muscle pains (back pain, shoulder pain - currently her right shoulder is bothering her).  She has pill bottle with her and current supply was filled in March 2014 with #30.  She just ran out of that now.      Problem list and histories reviewed & adjusted, as indicated.  Additional history: as documented    Patient Active Problem List   Diagnosis     Allergic rhinitis     Tobacco use disorder     Insomnia     Major depressive disorder, recurrent episode, moderate     Esophageal reflux     Generalized osteoarthrosis, unspecified site     COPD (chronic obstructive pulmonary disease) (H)     Stress reaction of bone     Back pain     Hiatal hernia     Advanced directives, counseling/discussion     Anxiety     CARDIOVASCULAR SCREENING; LDL GOAL LESS THAN 70     Acute right MCA stroke (H)     Intracranial vascular stenosis     Carotid disease, bilateral (H)     Mixed hyperlipidemia     Post herpetic neuralgia      "Alcohol dependence (H)     Hypertension goal BP (blood pressure) < 140/90     Past Surgical History:   Procedure Laterality Date     C NONSPECIFIC PROCEDURE           C NONSPECIFIC PROCEDURE      cholecystectomy     C NONSPECIFIC PROCEDURE      left distal radius fx       Social History   Substance Use Topics     Smoking status: Current Every Day Smoker     Types: Cigarettes     Smokeless tobacco: Current User      Comment: is using patch/quit plan     Alcohol use Yes      Comment: 2 drinks 3 times weekly     Family History   Problem Relation Age of Onset     GASTROINTESTINAL DISEASE Mother      pancreatitis     C.A.D. Father      mi in 70's     Genetic Disorder Sister      downs     DIABETES No family hx of      Hypertension No family hx of      CEREBROVASCULAR DISEASE No family hx of      Breast Cancer No family hx of      Cancer - colorectal No family hx of      Prostate Cancer No family hx of          BP Readings from Last 3 Encounters:   17 125/73   17 118/60   16 134/72    Wt Readings from Last 3 Encounters:   17 91 lb 8 oz (41.5 kg)   17 89 lb (40.4 kg)   17 90 lb 8 oz (41.1 kg)             Reviewed and updated as needed this visit by clinical staff  Tobacco  Allergies  Meds  Med Hx  Surg Hx  Fam Hx  Soc Hx      Reviewed and updated as needed this visit by Provider  Meds         ROS:  CONST: NEGATIVE for fever      OBJECTIVE:                                                    /73 (BP Location: Right arm, Patient Position: Chair, Cuff Size: Child)  Pulse 75  Temp 98.6  F (37  C) (Oral)  Resp 12  Ht 4' 10.5\" (1.486 m)  Wt 91 lb 8 oz (41.5 kg)  SpO2 100%  Breastfeeding? No  BMI 18.8 kg/m2  Body mass index is 18.8 kg/(m^2).  GEN:  no apparent distress  LUNGS:  normal respiratory effort, and lungs clear to auscultation bilaterally - no rales, rhonchi or wheezes  CV: regular rate and rhythm, normal S1 S2, no S3 or S4 and no murmur, click or " rub     Diagnostic Test Results:  none      ASSESSMENT/PLAN:                                                        1. Chronic obstructive pulmonary disease with acute exacerbation (H)  2. Tobacco use disorder  She'd like a zpak for COPD exacerbation.  This seems to be a mild exacerbation but I did give her that.  Encouraged smoking cessation.  Reviewed that we have prescribed wellbutrin to help her with smoking cessation and recently authorized 90-day supply of refills.    - azithromycin (ZITHROMAX) 250 MG tablet; Two tablets first day, then one tablet daily for four days.  Dispense: 6 tablet; Refill: 0      3. Acute pain of right shoulder  She seems to use this infrequently.  I did give her an additional #30 tabs but discussed that any further refills should come from PCP.  - cyclobenzaprine (FLEXERIL) 10 MG tablet; Take 0.5-1 tablets (5-10 mg) by mouth 3 times daily as needed for muscle spasms  Dispense: 30 tablet; Refill: 0     Patient instructed to contact clinic if symptoms persist, worsen, or do not resolve as anticipated.      Adalgisa Henao MD  [facility]

## 2017-06-19 ENCOUNTER — OFFICE VISIT (OUTPATIENT)
Dept: FAMILY MEDICINE | Facility: CLINIC | Age: 72
End: 2017-06-19
Payer: MEDICARE

## 2017-06-19 VITALS
OXYGEN SATURATION: 100 % | TEMPERATURE: 98.6 F | RESPIRATION RATE: 12 BRPM | DIASTOLIC BLOOD PRESSURE: 73 MMHG | WEIGHT: 91.5 LBS | SYSTOLIC BLOOD PRESSURE: 125 MMHG | HEIGHT: 59 IN | BODY MASS INDEX: 18.44 KG/M2 | HEART RATE: 75 BPM

## 2017-06-19 DIAGNOSIS — M25.511 ACUTE PAIN OF RIGHT SHOULDER: ICD-10-CM

## 2017-06-19 DIAGNOSIS — J44.1 CHRONIC OBSTRUCTIVE PULMONARY DISEASE WITH ACUTE EXACERBATION (H): Primary | ICD-10-CM

## 2017-06-19 DIAGNOSIS — F17.200 TOBACCO USE DISORDER: ICD-10-CM

## 2017-06-19 PROCEDURE — 99214 OFFICE O/P EST MOD 30 MIN: CPT | Performed by: FAMILY MEDICINE

## 2017-06-19 RX ORDER — AZITHROMYCIN 250 MG/1
TABLET, FILM COATED ORAL
Qty: 6 TABLET | Refills: 0 | Status: SHIPPED | OUTPATIENT
Start: 2017-06-19 | End: 2017-09-07

## 2017-06-19 RX ORDER — CYCLOBENZAPRINE HCL 10 MG
5-10 TABLET ORAL 3 TIMES DAILY PRN
Qty: 30 TABLET | Refills: 0 | Status: SHIPPED | OUTPATIENT
Start: 2017-06-19 | End: 2017-11-14

## 2017-06-19 NOTE — MR AVS SNAPSHOT
"              After Visit Summary   6/19/2017    Digna Montana    MRN: [id number]           Patient Information     Date Of Birth          1945        Visit Information        Provider Department      6/19/2017 8:40 AM Adalgisa Henao MD [facility]        Today's Diagnoses     Chronic obstructive pulmonary disease with acute exacerbation (H)    -  1    Acute pain of right shoulder          Care Instructions    Start the zithromax.    I refilled 30 tablets of cyclobenzaprine.  If you need more than that you should see your primary care physician.    Check with pharmacist to see if they have the wellbutrin refill we sent on June 1.              Follow-ups after your visit        Who to contact     If you have questions or need follow up information about today's clinic visit or your schedule please contact [facility] directly at [phone].  Normal or non-critical lab and imaging results will be communicated to you by MyChart, letter or phone within 4 business days after the clinic has received the results. If you do not hear from us within 7 days, please contact the clinic through MyChart or phone. If you have a critical or abnormal lab result, we will notify you by phone as soon as possible.  Submit refill requests through [email or web address] or call your pharmacy and they will forward the refill request to us. Please allow 3 business days for your refill to be completed.          Additional Information About Your Visit        MyChart Information     [email or web address] lets you send messages to your doctor, view your test results, renew your prescriptions, schedule appointments and more. To sign up, go to [email or web address] . Click on \"Log in\" on the left side of the screen, which will take you to the Welcome page. Then click on \"Sign up Now\" on the right side of the page.     You will be asked to enter the access code listed below, as well as some personal information. Please follow the " "directions to create your username and password.     Your access code is: [email or web address]  Expires: 2017  9:28 AM     Your access code will  in 90 days. If you need help or a new code, please call your [place] clinic or [phone].        Care EveryWhere ID     This is your Care EveryWhere ID. This could be used by other organizations to access your [place] medical records  [id number]        Your Vitals Were     Pulse Temperature Respirations Height Pulse Oximetry Breastfeeding?    75 98.6  F (37  C) (Oral) 12 4' 10.5\" (1.486 m) 100% No    BMI (Body Mass Index)                   18.8 kg/m2            Blood Pressure from Last 3 Encounters:   17 125/73   17 118/60   16 134/72    Weight from Last 3 Encounters:   17 91 lb 8 oz (41.5 kg)   17 89 lb (40.4 kg)   17 90 lb 8 oz (41.1 kg)              Today, you had the following     No orders found for display         Today's Medication Changes          These changes are accurate as of: 17  9:28 AM.  If you have any questions, ask your nurse or doctor.               Start taking these medicines.        Dose/Directions    cyclobenzaprine 10 MG tablet   Commonly known as:  FLEXERIL   Used for:  Acute pain of right shoulder   Started by:  Adalgisa Henao MD        Dose:  5-10 mg   Take 0.5-1 tablets (5-10 mg) by mouth 3 times daily as needed for muscle spasms   Quantity:  30 tablet   Refills:  0            Where to get your medicines      These medications were sent to [facility] [address] [address] [place] [address]     Phone:  [phone]     azithromycin 250 MG tablet    cyclobenzaprine 10 MG tablet                Primary Care Provider Office Phone # Fax #    Simon Jesus Madden [phone]       [address] [address]        Thank you!     Thank you for choosing [facility] " [facility]  for your care. Our goal is always to provide you with excellent care. Hearing back from our patients is one way we can continue to improve our services. Please take a few minutes to complete the written survey that you may receive in the mail after your visit with us. Thank you!             Your Updated Medication List - Protect others around you: Learn how to safely use, store and throw away your medicines at www.disposemymeds.org.          This list is accurate as of: 6/19/17  9:28 AM.  Always use your most recent med list.                   Brand Name Dispense Instructions for use    acetaminophen 325 MG tablet    TYLENOL    50 tablet    Take 1-2 tablets by mouth every 6 hours as needed.       ADVIL PO          * albuterol 108 (90 BASE) MCG/ACT Inhaler    PROAIR HFA/PROVENTIL HFA/VENTOLIN HFA    1 Inhaler    Inhale 2 puffs into the lungs every 4 hours as needed for shortness of breath / dyspnea       * albuterol (2.5 MG/3ML) 0.083% neb solution     1 Box    ONE NEBULIZATION 4 TIMES DAILY AS NEEDED       amLODIPine 5 MG tablet    NORVASC    90 tablet    TAKE 1 TABLET(5 MG) BY MOUTH DAILY       aspirin 325 MG EC tablet     100 tablet    Take 1 tablet (325 mg) by mouth daily       azithromycin 250 MG tablet    ZITHROMAX    6 tablet    Two tablets first day, then one tablet daily for four days.       buPROPion 150 MG 12 hr tablet    WELLBUTRIN SR    180 tablet    TAKE 1 TABLET BY MOUTH TWICE DAILY       cyclobenzaprine 10 MG tablet    FLEXERIL    30 tablet    Take 0.5-1 tablets (5-10 mg) by mouth 3 times daily as needed for muscle spasms       fluticasone 50 MCG/ACT spray    FLONASE    16 g    Spray 2 sprays into both nostrils daily       loratadine 10 MG tablet    CLARITIN    90 tablet    Take 1 tablet by mouth daily.       omeprazole 20 MG tablet     90 tablet    Take 1 tablet (20 mg) by mouth daily       simvastatin 40 MG tablet    ZOCOR    90 tablet    TAKE 1 TABLET(40 MG) BY MOUTH AT BEDTIME        SYMBICORT 160-4.5 MCG/ACT Inhaler   Generic drug:  budesonide-formoterol     3 Inhaler    Inhale 2 puffs into the lungs 2 times daily       traZODone 50 MG tablet    DESYREL    270 tablet    TAKE 1 TO 3 TABLETS(50  MG) BY MOUTH EVERY NIGHT AS NEEDED FOR SLEEP       * Notice:  This list has 2 medication(s) that are the same as other medications prescribed for you. Read the directions carefully, and ask your doctor or other care provider to review them with you.

## 2017-06-19 NOTE — PATIENT INSTRUCTIONS
Start the zithromax.    I refilled 30 tablets of cyclobenzaprine.  If you need more than that you should see your primary care physician.    Check with pharmacist to see if they have the wellbutrin refill we sent on June 1.

## 2017-06-19 NOTE — NURSING NOTE
"Chief Complaint   Patient presents with     COPD       Initial /73 (BP Location: Right arm, Patient Position: Chair, Cuff Size: Child)  Pulse 75  Temp 98.6  F (37  C) (Oral)  Resp 12  Ht 4' 10.5\" (1.486 m)  Wt 91 lb 8 oz (41.5 kg)  SpO2 100%  Breastfeeding? No  BMI 18.8 kg/m2 Estimated body mass index is 18.8 kg/(m^2) as calculated from the following:    Height as of this encounter: 4' 10.5\" (1.486 m).    Weight as of this encounter: 91 lb 8 oz (41.5 kg).  Medication Reconciliation: complete     Lisy Almodovar MA      "

## 2017-09-04 ENCOUNTER — TRANSFERRED RECORDS (OUTPATIENT)
Dept: HEALTH INFORMATION MANAGEMENT | Facility: CLINIC | Age: 72
End: 2017-09-04

## 2017-09-06 ENCOUNTER — TELEPHONE (OUTPATIENT)
Dept: FAMILY MEDICINE | Facility: CLINIC | Age: 72
End: 2017-09-06

## 2017-09-06 RX ORDER — CLINDAMYCIN HCL 300 MG
300 CAPSULE ORAL 3 TIMES DAILY
Qty: 15 CAPSULE | Refills: 0 | Status: CANCELLED | OUTPATIENT
Start: 2017-09-06 | End: 2017-09-11

## 2017-09-06 RX ORDER — SULFAMETHOXAZOLE/TRIMETHOPRIM 800-160 MG
1 TABLET ORAL 2 TIMES DAILY
Qty: 10 TABLET | Refills: 0 | Status: CANCELLED | OUTPATIENT
Start: 2017-09-06 | End: 2017-09-11

## 2017-09-06 NOTE — TELEPHONE ENCOUNTER
--Patient calling for appointment for cat bite.  --She says she was bitten on Saturday or Sunday she is not sure which.  --She says she was seen at a clinic in a [facility] suburb but she does not know the name of the clinic.   She was given abx  --I asked her several times if she needs help deciding now soon she needs to be seen but she did not want triage.  --I scheduled an appointment for her tomorrow at her request because there are no appts left today in the clinic at this time.    Marcelina Lucas RN

## 2017-09-07 ENCOUNTER — OFFICE VISIT (OUTPATIENT)
Dept: FAMILY MEDICINE | Facility: CLINIC | Age: 72
End: 2017-09-07
Payer: MEDICARE

## 2017-09-07 VITALS
SYSTOLIC BLOOD PRESSURE: 113 MMHG | WEIGHT: 93.75 LBS | TEMPERATURE: 97.7 F | RESPIRATION RATE: 13 BRPM | HEART RATE: 80 BPM | BODY MASS INDEX: 19.26 KG/M2 | OXYGEN SATURATION: 97 % | DIASTOLIC BLOOD PRESSURE: 74 MMHG

## 2017-09-07 DIAGNOSIS — F17.200 TOBACCO USE DISORDER: ICD-10-CM

## 2017-09-07 DIAGNOSIS — Z23 NEED FOR TDAP VACCINATION: ICD-10-CM

## 2017-09-07 DIAGNOSIS — Z28.21 INFLUENZA VACCINATION DECLINED: ICD-10-CM

## 2017-09-07 DIAGNOSIS — M79.604 PAIN OF RIGHT LOWER EXTREMITY: ICD-10-CM

## 2017-09-07 DIAGNOSIS — M79.89 RIGHT LEG SWELLING: ICD-10-CM

## 2017-09-07 DIAGNOSIS — Z28.21 PNEUMOCOCCAL VACCINATION DECLINED BY PATIENT: ICD-10-CM

## 2017-09-07 DIAGNOSIS — F33.1 MAJOR DEPRESSIVE DISORDER, RECURRENT EPISODE, MODERATE (H): ICD-10-CM
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
## 2017-09-07 PROCEDURE — 99214 OFFICE O/P EST MOD 30 MIN: CPT | Mod: 25 | Performed by: FAMILY MEDICINE

## 2017-09-07 PROCEDURE — 90715 TDAP VACCINE 7 YRS/> IM: CPT | Performed by: FAMILY MEDICINE

## 2017-09-07 PROCEDURE — 90471 IMMUNIZATION ADMIN: CPT | Performed by: FAMILY MEDICINE

## 2017-09-07 RX ORDER — HYDROCODONE BITARTRATE AND ACETAMINOPHEN 5; 325 MG/1; MG/1
TABLET ORAL
Refills: 0 | COMMUNITY
Start: 2017-09-04 | End: 2017-09-07

## 2017-09-07 RX ORDER — HYDROCODONE BITARTRATE AND ACETAMINOPHEN 5; 325 MG/1; MG/1
1-2 TABLET ORAL EVERY 4 HOURS PRN
Qty: 6 TABLET | Refills: 0 | Status: SHIPPED | OUTPATIENT
Start: 2017-09-07 | End: 2017-11-14

## 2017-09-07 RX ORDER — DOXYCYCLINE 100 MG/1
CAPSULE ORAL
Refills: 0 | COMMUNITY
Start: 2017-09-04 | End: 2017-09-13

## 2017-09-07 ASSESSMENT — ANXIETY QUESTIONNAIRES
3. WORRYING TOO MUCH ABOUT DIFFERENT THINGS: NOT AT ALL
5. BEING SO RESTLESS THAT IT IS HARD TO SIT STILL: NOT AT ALL
IF YOU CHECKED OFF ANY PROBLEMS ON THIS QUESTIONNAIRE, HOW DIFFICULT HAVE THESE PROBLEMS MADE IT FOR YOU TO DO YOUR WORK, TAKE CARE OF THINGS AT HOME, OR GET ALONG WITH OTHER PEOPLE: NOT DIFFICULT AT ALL
7. FEELING AFRAID AS IF SOMETHING AWFUL MIGHT HAPPEN: NOT AT ALL
6. BECOMING EASILY ANNOYED OR IRRITABLE: NOT AT ALL
GAD7 TOTAL SCORE: 0
1. FEELING NERVOUS, ANXIOUS, OR ON EDGE: NOT AT ALL
2. NOT BEING ABLE TO STOP OR CONTROL WORRYING: NOT AT ALL

## 2017-09-07 ASSESSMENT — PATIENT HEALTH QUESTIONNAIRE - PHQ9
5. POOR APPETITE OR OVEREATING: NOT AT ALL
SUM OF ALL RESPONSES TO PHQ QUESTIONS 1-9: 0

## 2017-09-07 NOTE — NURSING NOTE
"Chief Complaint   Patient presents with     Cat Bite       Initial /74 (BP Location: Right arm, Patient Position: Sitting, Cuff Size: Adult Regular)  Pulse 80  Temp 97.7  F (36.5  C) (Oral)  Resp 13  Wt 93 lb 12 oz (42.5 kg)  SpO2 97%  BMI 19.26 kg/m2 Estimated body mass index is 19.26 kg/(m^2) as calculated from the following:    Height as of 6/19/17: 4' 10.5\" (1.486 m).    Weight as of this encounter: 93 lb 12 oz (42.5 kg).  Medication Reconciliation: complete     Lorna Wang CMA  "

## 2017-09-07 NOTE — PROGRESS NOTES
SUBJECTIVE:   Digna Montana is a 71 year old female who presents to clinic today for the following health issues:      .Animal Bite-     Onset: 4 days day(s) ago    Location of bite: back of right low leg     Description:        Type of animal: Cat.        Was the animal known to you: YES         Animal's Immunization status: Animal's immunizations up to date                Circumstances of bite: stepped on cat's tail.    Accompanying Signs & Symptoms:       Redness YES          Pain level: moderate        Warmth YES          Drainage YES          Fevers no     Therapies tried- antibiotic-patient was seen in a clinic in the [facility]   When was your last Tetanus shot?  Due     Completed norco given , would like more  Reports pain and swelling, redness and warmth stable since occurred 4 days ago.   No fever or chills  Declining flu and tetanus and pneumonia shots then considering tetanus.     Problem list and histories reviewed & adjusted, as indicated.  Additional history: as documented    Patient Active Problem List   Diagnosis     Allergic rhinitis     Tobacco use disorder     Insomnia     Major depressive disorder, recurrent episode, moderate     Esophageal reflux     Generalized osteoarthrosis, unspecified site     COPD (chronic obstructive pulmonary disease) (H)     Stress reaction of bone     Back pain     Hiatal hernia     Advanced directives, counseling/discussion     Anxiety     CARDIOVASCULAR SCREENING; LDL GOAL LESS THAN 70     Acute right MCA stroke (H)     Intracranial vascular stenosis     Carotid disease, bilateral (H)     Mixed hyperlipidemia     Post herpetic neuralgia     Alcohol dependence (H)     Hypertension goal BP (blood pressure) < 140/90     Past Surgical History:   Procedure Laterality Date     C NONSPECIFIC PROCEDURE           C NONSPECIFIC PROCEDURE      cholecystectomy     C NONSPECIFIC PROCEDURE      left distal radius fx       Social History   Substance Use  Topics     Smoking status: Current Every Day Smoker     Types: Cigarettes     Smokeless tobacco: Current User      Comment: is using patch/quit plan     Alcohol use Yes      Comment: 2 drinks 3 times weekly     Family History   Problem Relation Age of Onset     GASTROINTESTINAL DISEASE Mother      pancreatitis     C.A.D. Father      mi in 70's     Genetic Disorder Sister      downs     DIABETES No family hx of      Hypertension No family hx of      CEREBROVASCULAR DISEASE No family hx of      Breast Cancer No family hx of      Cancer - colorectal No family hx of      Prostate Cancer No family hx of          Current Outpatient Prescriptions   Medication Sig Dispense Refill     doxycycline (VIBRAMYCIN) 100 MG capsule TK ONE C PO  BID FOR 10 DAYS  0     CLINDAMYCIN HCL PO Take 300 mg by mouth every 6 hours       HYDROcodone-acetaminophen (NORCO) 5-325 MG per tablet Take 1-2 tablets by mouth every 4 hours as needed for moderate to severe pain maximum 1 tablet(s) per day 6 tablet 0     cyclobenzaprine (FLEXERIL) 10 MG tablet Take 0.5-1 tablets (5-10 mg) by mouth 3 times daily as needed for muscle spasms 30 tablet 0     buPROPion (WELLBUTRIN SR) 150 MG 12 hr tablet TAKE 1 TABLET BY MOUTH TWICE DAILY 180 tablet 0     traZODone (DESYREL) 50 MG tablet TAKE 1 TO 3 TABLETS(50  MG) BY MOUTH EVERY NIGHT AS NEEDED FOR SLEEP 270 tablet 3     simvastatin (ZOCOR) 40 MG tablet TAKE 1 TABLET(40 MG) BY MOUTH AT BEDTIME 90 tablet 2     amLODIPine (NORVASC) 5 MG tablet TAKE 1 TABLET(5 MG) BY MOUTH DAILY 90 tablet 3     fluticasone (FLONASE) 50 MCG/ACT nasal spray Spray 2 sprays into both nostrils daily 16 g 6     Ibuprofen (ADVIL PO)        albuterol (PROAIR HFA, PROVENTIL HFA, VENTOLIN HFA) 108 (90 BASE) MCG/ACT inhaler Inhale 2 puffs into the lungs every 4 hours as needed for shortness of breath / dyspnea 1 Inhaler 2     albuterol (2.5 MG/3ML) 0.083% nebulizer solution ONE NEBULIZATION 4 TIMES DAILY AS NEEDED 1 Box 0      omeprazole 20 MG tablet Take 1 tablet (20 mg) by mouth daily 90 tablet 2     aspirin 325 MG EC tablet Take 1 tablet (325 mg) by mouth daily 100 tablet 1     budesonide-formoterol (SYMBICORT) 160-4.5 MCG/ACT inhaler Inhale 2 puffs into the lungs 2 times daily 3 Inhaler 1     loratadine (CLARITIN) 10 MG tablet Take 1 tablet by mouth daily.    90 tablet 3     acetaminophen (TYLENOL) 325 MG tablet Take 1-2 tablets by mouth every 6 hours as needed. 50 tablet prn     Allergies   Allergen Reactions     Penicillins      Throat swelled up      Recent Labs   Lab Test  02/28/17   1133  12/07/15   0747  08/19/15   1416  08/28/14   1030  08/06/14   1147  12/21/13   1723   02/15/12   1205   A1C   --    --    --    --    --   5.2   --    --    LDL  91  96   --    --   44  132*   --    --    HDL  76  80   --    --   79  87   --    --    TRIG  98  62   --    --   54  128   --    --    ALT   --    --   23   --   13   --    --   18   CR   --    --   0.70  0.66  0.76  0.66   < >  0.79   GFRESTIMATED   --    --   82  88  76  89   < >  73   GFRESTBLACK   --    --   >90   GFR Calc    >90   GFR Calc    >90   GFR Calc    >90   < >  88   POTASSIUM   --    --   3.8   --   4.5  3.1*   < >  3.8    < > = values in this interval not displayed.      BP Readings from Last 3 Encounters:   09/07/17 113/74   06/19/17 125/73   03/16/17 118/60    Wt Readings from Last 3 Encounters:   09/07/17 93 lb 12 oz (42.5 kg)   06/19/17 91 lb 8 oz (41.5 kg)   03/16/17 89 lb (40.4 kg)                  Labs reviewed in EPIC          Reviewed and updated as needed this visit by clinical staff       Reviewed and updated as needed this visit by Provider         ROS:  Constitutional, HEENT, cardiovascular, pulmonary, GI, , musculoskeletal, neuro, skin, endocrine and psych systems are negative, except as otherwise noted.      OBJECTIVE:   /74 (BP Location: Right arm, Patient Position: Sitting, Cuff Size: Adult  Regular)  Pulse 80  Temp 97.7  F (36.5  C) (Oral)  Resp 13  Wt 93 lb 12 oz (42.5 kg)  SpO2 97%  BMI 19.26 kg/m2  Body mass index is 19.26 kg/(m^2).  GENERAL: healthy, alert and no distress  EYES: Eyes grossly normal to inspection, PERRL and conjunctivae and sclerae normal  HENT: ear canals and TM's normal, nose and mouth without ulcers or lesions  NECK: no adenopathy, no asymmetry, masses, or scars and thyroid normal to palpation  RESP: lungs clear to auscultation - no rales, rhonchi or wheezes  CV: regular rate and rhythm, normal S1 S2, no S3 or S4, no murmur, click or rub, no peripheral edema and peripheral pulses strong  ABDOMEN: soft, non tender, no hepatosplenomegaly, no masses and bowel sounds normal  Skin / msk : right leg swollen , red warm , pitting edema in foot. Tenderness over calf where has evidence of a puncture wound from car bite. Indurated calf, no fluctuance felt. Has pain on walking on foot   NEURO: Normal strength and tone, mentation intact and speech normal  PSYCH: mentation appears normal, affect normal/bright    Diagnostic Test Results:  No results found for this or any previous visit (from the past 24 hour(s)).    ASSESSMENT/PLAN:   Smoker, COPD on albuterol and Symbicort, allergic to PCN, prior right MCA stroke on ASA, intracranial stenosis, B/l carotid disease, HLD on simvastatin, HT on amlodipine, hiatal hernia, esophageal reflux on Prilosec, allergic rhinitis on Flonase and Claritin, post herpetic neuralgia hx , back pain, OA on flexeril prn, anxiety, MDD on Wellbutrin, insomnia on trazodone, noted hx of alcohol dependance, prior C/s , colleen, left radial fracture repair, seen 6/19 for COPD exacerbation and given zpak and flexeril refilled temporarily. called 9/6 saying she was bitten on Saturday or Sunday she is not sure which. Reported  she was seen at a clinic in a [facility] suburb but she did not know the name of the clinic.   She was given abx unknown type. [place]  shows received  norco # 10 given in [place] on 9/4/17. Here for cat bite   1. Cat bite, initial encounter  Right leg swollen and red. Need to rule out abscess and blood clot. Already on appropriate abx doxycycline and clinda 4 days given PCN allergy. Reports no worsening of symptoms but not better . Ran out of her norco which brought her in. Ordered non vascular and doppler venous u/s to be done today but declined to go any where for it today or arrange for tomorrow with a ride as says will not go does not have means understanding that missing a dvt could be fatal. 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 Declines to go to the ER for iv antibiotics  Or if worse. Apt advised to made for her to see her PCP next week for a recheck. But went without getting that scheduled.  Limited norco # 6 pills given, no further refills by self and advised to continue antibiotics given till done.   Do not drink any alcohol on norco. Given hx of alcohol dependence in chart hesitant to give her any more. Follow up next week with primary dr Madden for recheck . also reminded she is due for physical   Due for hep C screen, dexa, mammogram, flu and pneumonia shots. Go to the ER if worse before that. Take Probiotics while on antibiotics. Cat apparently utd with immunizations, house cat so rabies not indicated.   - VACCINE ADMINISTRATION, INITIAL  - TDAP VACCINE (ADACEL)  - US Lower Extremity Venous Duplex Right; Future  - US Extremity Non Vascular Right; Future  - HYDROcodone-acetaminophen (NORCO) 5-325 MG per tablet; Take 1-2 tablets by mouth every 4 hours as needed for moderate to severe pain maximum 1 tablet(s) per day  Dispense: 6 tablet; Refill: 0    2. Right leg swelling  - US Lower Extremity Venous Duplex Right; Future  - US Extremity Non Vascular Right; Future    3. Pain of right lower extremity  - US Lower Extremity Venous Duplex Right; Future  - US Extremity Non Vascular Right; Future  - HYDROcodone-acetaminophen (NORCO) 5-325 MG per tablet; Take  1-2 tablets by mouth every 4 hours as needed for moderate to severe pain maximum 1 tablet(s) per day  Dispense: 6 tablet; Refill: 0    4. Tobacco use disorder  May affect wound healing     5. Major depressive disorder, recurrent episode, moderate  PHQ 9 = 0 today     6. Influenza vaccination declined    7. Pneumococcal vaccination declined by patient    8. Need for Tdap vaccination  - VACCINE ADMINISTRATION, INITIAL  - TDAP VACCINE (ADACEL)        See Patient Instructions  Patient Instructions   Right leg swollen and red  Need to rule out abscess and blood clot   Two different ultrasound today to do that   Tetanus shot to prevent tetanus from wound   Complete antibiotics given  Few more norco given   No further refills  Do not drink any alcohol on this med  Follow up next week with primary dr Madden for recheck . also due for physical   Due for hep c screen, dexa, mammogram, flu and pneumonia shots   Go to the ER if worse before that       Cristela Barr MD  [facility]

## 2017-09-07 NOTE — PATIENT INSTRUCTIONS
Right leg swollen and red  Need to rule out abscess and blood clot   Two different ultrasound today to do that   Tetanus shot to prevent tetanus from wound   Complete antibiotics given  Few more norco given   No further refills  Do not drink any alcohol on this med  Follow up next week with primary dr Madden for recheck . also due for physical   Due for hep c screen, dexa, mammogram, flu and pneumonia shots   Go to the ER if worse before that

## 2017-09-07 NOTE — MR AVS SNAPSHOT
After Visit Summary   9/7/2017    Digna Montana    MRN: [id number]           Patient Information     Date Of Birth          1945        Visit Information        Provider Department      9/7/2017 12:00 PM Cristela Barr MD [facility]        Today's Diagnoses     Cat bite, initial encounter    -  1    Tobacco use disorder        Major depressive disorder, recurrent episode, moderate        Influenza vaccination declined        Pneumococcal vaccination declined by patient        Need for Tdap vaccination        Right leg swelling        Pain of right lower extremity          Care Instructions    Right leg swollen and red  Need to rule out abscess and blood clot   Two different ultrasound today to do that   Tetanus shot to prevent tetanus from wound   Complete antibiotics given  Few more norco given   No further refills  Do not drink any alcohol on this med  Follow up next week with primary dr Madden for recheck . also due for physical   Due for hep c screen, dexa, mammogram, flu and pneumonia shots   Go to the ER if worse before that           Follow-ups after your visit        Future tests that were ordered for you today     Open Future Orders        Priority Expected Expires Ordered    US Lower Extremity Venous Duplex Right STAT  9/7/2018 9/7/2017    US Extremity Non Vascular Right STAT  9/7/2018 9/7/2017            Who to contact     If you have questions or need follow up information about today's clinic visit or your schedule please contact [facility] directly at [phone].  Normal or non-critical lab and imaging results will be communicated to you by MyChart, letter or phone within 4 business days after the clinic has received the results. If you do not hear from us within 7 days, please contact the clinic through MyChart or phone. If you have a critical or abnormal lab result, we will notify you by phone as soon as possible.  Submit refill requests through  "[employer] or call your pharmacy and they will forward the refill request to us. Please allow 3 business days for your refill to be completed.          Additional Information About Your Visit        MyChart Information     [employer] lets you send messages to your doctor, view your test results, renew your prescriptions, schedule appointments and more. To sign up, go to [email or web address] . Click on \"Log in\" on the left side of the screen, which will take you to the Welcome page. Then click on \"Sign up Now\" on the right side of the page.     You will be asked to enter the access code listed below, as well as some personal information. Please follow the directions to create your username and password.     Your access code is: [email or web address]  Expires: 2017  9:28 AM     Your access code will  in 90 days. If you need help or a new code, please call your [place] clinic or [phone].        Care EveryWhere ID     This is your Care EveryWhere ID. This could be used by other organizations to access your [place] medical records  [id number]        Your Vitals Were     Pulse Temperature Respirations Pulse Oximetry BMI (Body Mass Index)       80 97.7  F (36.5  C) (Oral) 13 97% 19.26 kg/m2        Blood Pressure from Last 3 Encounters:   17 113/74   17 125/73   17 118/60    Weight from Last 3 Encounters:   17 93 lb 12 oz (42.5 kg)   17 91 lb 8 oz (41.5 kg)   17 89 lb (40.4 kg)              We Performed the Following     TDAP VACCINE (ADACEL)     VACCINE ADMINISTRATION, INITIAL          Today's Medication Changes          These changes are accurate as of: 17 12:09 PM.  If you have any questions, ask your nurse or doctor.               Start taking these medicines.        Dose/Directions    HYDROcodone-acetaminophen 5-325 MG per tablet   Commonly known as:  NORCO   Used for:  Cat bite, initial encounter, Pain of right lower extremity   Started by:  Cristela Barr MD        Dose: "  1-2 tablet   Take 1-2 tablets by mouth every 4 hours as needed for moderate to severe pain maximum 1 tablet(s) per day   Quantity:  6 tablet   Refills:  0         These medicines have changed or have updated prescriptions.        Dose/Directions    CLINDAMYCIN HCL PO   This may have changed:  Another medication with the same name was removed. Continue taking this medication, and follow the directions you see here.   Changed by:  Cristela Barr MD        Dose:  300 mg   Take 300 mg by mouth every 6 hours   Refills:  0            Where to get your medicines      Some of these will need a paper prescription and others can be bought over the counter.  Ask your nurse if you have questions.     Bring a paper prescription for each of these medications     HYDROcodone-acetaminophen 5-325 MG per tablet                Primary Care Provider Office Phone # Fax #    Simon Jesus Madden [phone] [address] [place], [place] [id number]        Equal Access to Services     [facility] CenterJOSEPH : Hadii favian fraziero Sonikkie, waaxda luqadaha, qaybta kaalmada adedarrylyaelisa, shilo peng . So [facility] [phone].    ATENCIÓN: Si habla español, tiene a peter disposición servicios gratuitos de asistencia lingüística. [facility] [phone].    We comply with applicable federal civil rights laws and [place] laws. We do not discriminate on the basis of race, color, national origin, age, disability sex, sexual orientation or gender identity.            Thank you!     Thank you for choosing [facility]  for your care. Our goal is always to provide you with excellent care. Hearing back from our patients is one way we can continue to improve our services. Please take a few minutes to complete the written survey that you may receive in the mail after your visit with us. Thank you!             Your Updated Medication List - Protect others around you: Learn how to safely use, store and  throw away your medicines at www.disposemymeds.org.          This list is accurate as of: 9/7/17 12:09 PM.  Always use your most recent med list.                   Brand Name Dispense Instructions for use Diagnosis    acetaminophen 325 MG tablet    TYLENOL    50 tablet    Take 1-2 tablets by mouth every 6 hours as needed.    Bronchitis with bronchospasm       ADVIL PO           * albuterol 108 (90 BASE) MCG/ACT Inhaler    PROAIR HFA/PROVENTIL HFA/VENTOLIN HFA    1 Inhaler    Inhale 2 puffs into the lungs every 4 hours as needed for shortness of breath / dyspnea    Chronic airway obstruction, not elsewhere classified       * albuterol (2.5 MG/3ML) 0.083% neb solution     1 Box    ONE NEBULIZATION 4 TIMES DAILY AS NEEDED    COPD (chronic obstructive pulmonary disease) (H)       amLODIPine 5 MG tablet    NORVASC    90 tablet    TAKE 1 TABLET(5 MG) BY MOUTH DAILY    Hypertension goal BP (blood pressure) < 140/90       aspirin 325 MG EC tablet     100 tablet    Take 1 tablet (325 mg) by mouth daily    Atherosclerotic occlusive disease       buPROPion 150 MG 12 hr tablet    WELLBUTRIN SR    180 tablet    TAKE 1 TABLET BY MOUTH TWICE DAILY    Tobacco use disorder       CLINDAMYCIN HCL PO      Take 300 mg by mouth every 6 hours        cyclobenzaprine 10 MG tablet    FLEXERIL    30 tablet    Take 0.5-1 tablets (5-10 mg) by mouth 3 times daily as needed for muscle spasms    Acute pain of right shoulder       doxycycline 100 MG capsule    VIBRAMYCIN     TK ONE C PO  BID FOR 10 DAYS        fluticasone 50 MCG/ACT spray    FLONASE    16 g    Spray 2 sprays into both nostrils daily    Chronic rhinitis       HYDROcodone-acetaminophen 5-325 MG per tablet    NORCO    6 tablet    Take 1-2 tablets by mouth every 4 hours as needed for moderate to severe pain maximum 1 tablet(s) per day    Cat bite, initial encounter, Pain of right lower extremity       loratadine 10 MG tablet    CLARITIN    90 tablet    Take 1 tablet by mouth daily.     Allergic rhinitis, cause unspecified       omeprazole 20 MG tablet     90 tablet    Take 1 tablet (20 mg) by mouth daily    Esophageal reflux       simvastatin 40 MG tablet    ZOCOR    90 tablet    TAKE 1 TABLET(40 MG) BY MOUTH AT BEDTIME    Hyperlipidemia LDL goal <70       SYMBICORT 160-4.5 MCG/ACT Inhaler   Generic drug:  budesonide-formoterol     3 Inhaler    Inhale 2 puffs into the lungs 2 times daily    COPD (chronic obstructive pulmonary disease) (H)       traZODone 50 MG tablet    DESYREL    270 tablet    TAKE 1 TO 3 TABLETS(50  MG) BY MOUTH EVERY NIGHT AS NEEDED FOR SLEEP    Insomnia, unspecified type       * Notice:  This list has 2 medication(s) that are the same as other medications prescribed for you. Read the directions carefully, and ask your doctor or other care provider to review them with you.

## 2017-09-07 NOTE — NURSING NOTE
Screening Questionnaire for Adult Immunization     Are you sick today?   No    Do you have allergies to medications, food or any vaccine?   Yes    Have you ever had a serious reaction after receiving a vaccination?   No    Do you have a long-term health problem with heart disease, lung disease,  asthma, kidney disease, diabetes, anemia, metabolic or blood disease?   Yes    Do you have cancer, leukemia, AIDS, or any immune system problem?   No    Do you take cortisone, prednisone, other steroids, or anticancer drugs, or  have you had any x-ray (radiation) treatments?   No    Have you had a seizure, brain, or other nervous system problem?   No    During the past year, have you received a transfusion of blood or blood       products, or been given a medicine called immune (gamma) globulin?   No    For women: Are you pregnant or is there a chance you could become         pregnant during the next month?   No    Have you received any vaccinations in the past 4 weeks?   No     Immunization questionnaire was positive for at least one answer.  Notified Dr. Barr.      MNV doesn't apply on this patient      Per orders of Dr. Barr, injection of tdap given by Lorna Wang. Patient instructed to remain in clinic for 20 minutes afterwards, and to report any adverse reaction to me immediately.    Prior to injection verified patient identity using patient's name and date of birth.         Screening performed by Lorna Wang, CMA

## 2017-09-08 ENCOUNTER — HOSPITAL ENCOUNTER (EMERGENCY)
Facility: CLINIC | Age: 72
Discharge: HOME | End: 2017-09-08
Attending: EMERGENCY MEDICINE | Admitting: EMERGENCY MEDICINE
Payer: MEDICARE

## 2017-09-08 VITALS
HEART RATE: 82 BPM | OXYGEN SATURATION: 97 % | DIASTOLIC BLOOD PRESSURE: 85 MMHG | BODY MASS INDEX: 19.11 KG/M2 | SYSTOLIC BLOOD PRESSURE: 150 MMHG | RESPIRATION RATE: 16 BRPM | WEIGHT: 93 LBS | TEMPERATURE: 97.2 F
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
## 2017-09-08 DIAGNOSIS — S81.851A: ICD-10-CM

## 2017-09-08 DIAGNOSIS — L08.9 LOCAL SKIN INFECTION: ICD-10-CM

## 2017-09-08 PROCEDURE — 99283 EMERGENCY DEPT VISIT LOW MDM: CPT | Performed by: EMERGENCY MEDICINE

## 2017-09-08 PROCEDURE — 99283 EMERGENCY DEPT VISIT LOW MDM: CPT | Mod: Z6 | Performed by: EMERGENCY MEDICINE

## 2017-09-08 PROCEDURE — A9270 NON-COVERED ITEM OR SERVICE: HCPCS | Mod: GY | Performed by: EMERGENCY MEDICINE

## 2017-09-08 PROCEDURE — 25000132 ZZH RX MED GY IP 250 OP 250 PS 637: Mod: GY | Performed by: EMERGENCY MEDICINE

## 2017-09-08 RX ADMIN — AMOXICILLIN AND CLAVULANATE POTASSIUM 1 TABLET: 875; 125 TABLET, FILM COATED ORAL at 21:17

## 2017-09-08 ASSESSMENT — ENCOUNTER SYMPTOMS
JOINT SWELLING: 1
SHORTNESS OF BREATH: 0
WOUND: 1
ABDOMINAL PAIN: 0
COLOR CHANGE: 1
FEVER: 0

## 2017-09-08 ASSESSMENT — ANXIETY QUESTIONNAIRES: GAD7 TOTAL SCORE: 0

## 2017-09-08 NOTE — ED AVS SNAPSHOT
[facility] [place], Emergency Department    [address]    [facility] [address]    Phone:  [phone]    Fax:  [phone]                                       Digna Montana   MRN: [id number]    Department:  [facility], Emergency Department   Date of Visit:  9/8/2017           After Visit Summary Signature Page     I have received my discharge instructions, and my questions have been answered. I have discussed any challenges I see with this plan with the nurse or doctor.    ..........................................................................................................................................  Patient/Patient Representative Signature      ..........................................................................................................................................  Patient Representative Print Name and Relationship to Patient    ..................................................               ................................................  Date                                            Time    ..........................................................................................................................................  Reviewed by Signature/Title    ...................................................              ..............................................  Date                                                            Time

## 2017-09-08 NOTE — ED AVS SNAPSHOT
[facility], Emergency Department    [address]    [place] [address]    Phone:  [phone]    Fax:  [phone]                                       Digna [place]   MRN: [id number]    Department:  [facility], Emergency Department   Date of Visit:  9/8/2017           Patient Information     Date Of Birth          1945        Your diagnoses for this visit were:     None       You were seen by José Curran MD.        Discharge Instructions       Please make an appointment to follow up with Your Primary Care Provider as soon as possible.    Stop taking other antibiotics and start taking Augmentin as directed.    Return to the emergency department for fevers, worsening symptoms, or any other problems.      24 Hour Appointment Hotline       To make an appointment at any [facility] clinic, call [phone] ([phone]). If you don't have a family doctor or clinic, we will help you find one. [facility] clinics are conveniently located to serve the needs of you and your family.             Review of your medicines      START taking        Dose / Directions Last dose taken    amoxicillin-clavulanate 875-125 MG per tablet   Commonly known as:  AUGMENTIN   Dose:  1 tablet   Quantity:  14 tablet        Take 1 tablet by mouth 2 times daily for 7 days   Refills:  0          Our records show that you are taking the medicines listed below. If these are incorrect, please call your family doctor or clinic.        Dose / Directions Last dose taken    acetaminophen 325 MG tablet   Commonly known as:  TYLENOL   Dose:  1-2 tablet   Quantity:  50 tablet        Take 1-2 tablets by mouth every 6 hours as needed.   Refills:  prn        ADVIL PO        Refills:  0        * albuterol 108 (90 BASE) MCG/ACT Inhaler   Commonly known as:  PROAIR HFA/PROVENTIL HFA/VENTOLIN HFA   Dose:  2 puff   Quantity:  1 Inhaler        Inhale 2 puffs into the lungs every 4 hours as needed for shortness of breath /  dyspnea   Refills:  2        * albuterol (2.5 MG/3ML) 0.083% neb solution   Quantity:  1 Box        ONE NEBULIZATION 4 TIMES DAILY AS NEEDED   Refills:  0        amLODIPine 5 MG tablet   Commonly known as:  NORVASC   Quantity:  90 tablet        TAKE 1 TABLET(5 MG) BY MOUTH DAILY   Refills:  3        aspirin 325 MG EC tablet   Dose:  325 mg   Quantity:  100 tablet        Take 1 tablet (325 mg) by mouth daily   Refills:  1        buPROPion 150 MG 12 hr tablet   Commonly known as:  WELLBUTRIN SR   Quantity:  180 tablet        TAKE 1 TABLET BY MOUTH TWICE DAILY   Refills:  0        CLINDAMYCIN HCL PO   Dose:  300 mg        Take 300 mg by mouth every 6 hours   Refills:  0        cyclobenzaprine 10 MG tablet   Commonly known as:  FLEXERIL   Dose:  5-10 mg   Quantity:  30 tablet        Take 0.5-1 tablets (5-10 mg) by mouth 3 times daily as needed for muscle spasms   Refills:  0        doxycycline 100 MG capsule   Commonly known as:  VIBRAMYCIN        TK ONE C PO  BID FOR 10 DAYS   Refills:  0        fluticasone 50 MCG/ACT spray   Commonly known as:  FLONASE   Dose:  2 spray   Quantity:  16 g        Spray 2 sprays into both nostrils daily   Refills:  6        HYDROcodone-acetaminophen 5-325 MG per tablet   Commonly known as:  NORCO   Dose:  1-2 tablet   Quantity:  6 tablet        Take 1-2 tablets by mouth every 4 hours as needed for moderate to severe pain maximum 1 tablet(s) per day   Refills:  0        loratadine 10 MG tablet   Commonly known as:  CLARITIN   Dose:  10 mg   Quantity:  90 tablet        Take 1 tablet by mouth daily.   Refills:  3        omeprazole 20 MG tablet   Dose:  20 mg   Quantity:  90 tablet        Take 1 tablet (20 mg) by mouth daily   Refills:  2        simvastatin 40 MG tablet   Commonly known as:  ZOCOR   Quantity:  90 tablet        TAKE 1 TABLET(40 MG) BY MOUTH AT BEDTIME   Refills:  2        SYMBICORT 160-4.5 MCG/ACT Inhaler   Dose:  2 puff   Quantity:  3 Inhaler   Generic drug:   "budesonide-formoterol        Inhale 2 puffs into the lungs 2 times daily   Refills:  1        traZODone 50 MG tablet   Commonly known as:  DESYREL   Quantity:  270 tablet        TAKE 1 TO 3 TABLETS(50  MG) BY MOUTH EVERY NIGHT AS NEEDED FOR SLEEP   Refills:  3        * Notice:  This list has 2 medication(s) that are the same as other medications prescribed for you. Read the directions carefully, and ask your doctor or other care provider to review them with you.            Prescriptions were sent or printed at these locations (1 Prescription)                   Other Prescriptions                Printed at Department/Unit printer (1 of 1)         amoxicillin-clavulanate (AUGMENTIN) 875-125 MG per tablet                Orders Needing Specimen Collection     None      Pending Results     No orders found from 9/6/2017 to 9/9/2017.            Pending Culture Results     No orders found from 9/6/2017 to 9/9/2017.            Pending Results Instructions     If you had any lab results that were not finalized at the time of your Discharge, you can call the ED Lab Result RN at [phone]. You will be contacted by this team for any positive Lab results or changes in treatment. The nurses are available 7 days a week from 10A to 6:30P.  You can leave a message 24 hours per day and they will return your call.        Thank you for choosing [facility]       Thank you for choosing [facility] for your care. Our goal is always to provide you with excellent care. Hearing back from our patients is one way we can continue to improve our services. Please take a few minutes to complete the written survey that you may receive in the mail after you visit with us. Thank you!        [employer] Information     [facility] [employer] lets you send messages to your doctor, view your test results, renew your prescriptions, schedule appointments and more. To sign up, go to [email or web address] . Click on \"Log in\" on the left side of the screen, which will " "take you to the Welcome page. Then click on \"Sign up Now\" on the right side of the page.     You will be asked to enter the access code listed below, as well as some personal information. Please follow the directions to create your username and password.     Your access code is: [email or web address]  Expires: 2017  9:28 AM     Your access code will  in 90 days. If you need help or a new code, please call your [facility] clinic or [phone].        Care EveryWhere ID     This is your Care EveryWhere ID. This could be used by other organizations to access your [facility] medical records  [id number]        Equal Access to Services     GWENDOLYN CONSTANTINO : Bob English, monse cassidy, bharath dillard, shilo barkley. So [facility] [phone].    ATENCIÓN: Si habla español, tiene a peter disposición servicios gratuitos de asistencia lingüística. Llame al [phone].    We comply with applicable federal civil rights laws and [place] laws. We do not discriminate on the basis of race, color, national origin, age, disability sex, sexual orientation or gender identity.            After Visit Summary       This is your record. Keep this with you and show to your community pharmacist(s) and doctor(s) at your next visit.                  "

## 2017-09-09 NOTE — ED PROVIDER NOTES
History     Chief Complaint   Patient presents with     Cat Bite     Onset 2 days ago with cat bite to right lower leg, now has redness and swelling.     JUANCARLOS [place] is a 71 year old female who presents to the ED after developing erythema and swelling of the right leg following a cat bite yesterday. The patient sought treatment for this yesterday about 20 minutes after the incident, and she had her tetanus updated and was given 300 mg clindamycin Q6h and doxycycline at that time. She awoke this morning with the leg in its usual state, but throughout the day she developed increasing swelling and erythema on the posterior right leg, just proximal to the ankle. The patient reports that the cat is up to date on its vaccinations. She is very adamant about not being admitted for further treatment, as her  is in a nursing home, and her apartment door is unlocked. Of note, the patient reports that though she has a listed allergy to penicillin, she has received it in the past without any adverse effects.    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 Father      mi in 70's     Genetic Disorder Sister      downs     DIABETES No family hx of      Hypertension No  family hx of      CEREBROVASCULAR DISEASE No family hx of      Breast Cancer No family hx of      Cancer - colorectal No family hx of      Prostate Cancer No family hx of        SOCIAL HISTORY:   Social History   Substance Use Topics     Smoking status: Current Every Day Smoker     Types: Cigarettes     Smokeless tobacco: Current User      Comment: is using patch/quit plan     Alcohol use Yes      Comment: 2 drinks 3 times weekly       Discharge Medication List as of 9/8/2017  9:35 PM      START taking these medications    Details   amoxicillin-clavulanate (AUGMENTIN) 875-125 MG per tablet Take 1 tablet by mouth 2 times daily for 7 days, Disp-14 tablet, R-0, Local Print         CONTINUE these medications which have NOT CHANGED    Details   HYDROcodone-acetaminophen (NORCO) 5-325 MG per tablet Take 1-2 tablets by mouth every 4 hours as needed for moderate to severe pain maximum 1 tablet(s) per day, Disp-6 tablet, R-0, Local Print      doxycycline (VIBRAMYCIN) 100 MG capsule TK ONE C PO  BID FOR 10 DAYS, R-0, Historical      CLINDAMYCIN HCL PO Take 300 mg by mouth every 6 hours, Historical      cyclobenzaprine (FLEXERIL) 10 MG tablet Take 0.5-1 tablets (5-10 mg) by mouth 3 times daily as needed for muscle spasms, Disp-30 tablet, R-0, E-Prescribe      buPROPion (WELLBUTRIN SR) 150 MG 12 hr tablet TAKE 1 TABLET BY MOUTH TWICE DAILY, Disp-180 tablet, R-0, E-Prescribe      traZODone (DESYREL) 50 MG tablet TAKE 1 TO 3 TABLETS(50  MG) BY MOUTH EVERY NIGHT AS NEEDED FOR SLEEP, Disp-270 tablet, R-3, E-Prescribe      simvastatin (ZOCOR) 40 MG tablet TAKE 1 TABLET(40 MG) BY MOUTH AT BEDTIME, Disp-90 tablet, R-2, E-Prescribe      amLODIPine (NORVASC) 5 MG tablet TAKE 1 TABLET(5 MG) BY MOUTH DAILY, Disp-90 tablet, R-3, E-Prescribe      fluticasone (FLONASE) 50 MCG/ACT nasal spray Spray 2 sprays into both nostrils daily, Disp-16 g, R-6, E-Prescribe1 package.      Ibuprofen (ADVIL PO) Historical      albuterol (PROAIR HFA,  PROVENTIL HFA, VENTOLIN HFA) 108 (90 BASE) MCG/ACT inhaler Inhale 2 puffs into the lungs every 4 hours as needed for shortness of breath / dyspnea, Disp-1 Inhaler, R-2, E-Prescribe      albuterol (2.5 MG/3ML) 0.083% nebulizer solution ONE NEBULIZATION 4 TIMES DAILY AS NEEDED, Disp-1 Box, R-0, E-Prescribe      omeprazole 20 MG tablet Take 1 tablet (20 mg) by mouth daily, Disp-90 tablet, R-2, Fax      aspirin 325 MG EC tablet Take 1 tablet (325 mg) by mouth daily, Disp-100 tablet, R-1, E-Prescribe      budesonide-formoterol (SYMBICORT) 160-4.5 MCG/ACT inhaler Inhale 2 puffs into the lungs 2 times daily, Disp-3 Inhaler, R-1, Historical      loratadine (CLARITIN) 10 MG tablet Take 1 tablet by mouth daily.   , Disp-90 tablet, R-3, E-Prescribe      acetaminophen (TYLENOL) 325 MG tablet Take 1-2 tablets by mouth every 6 hours as needed., Disp-50 tablet, R-prn, E-Prescribe                Allergies   Allergen Reactions     Penicillins      Throat swelled up        I have reviewed the Medications, Allergies, Past Medical and Surgical History, and Social History in the Epic system.    Review of Systems   Constitutional: Negative for fever.   Respiratory: Negative for shortness of breath.    Cardiovascular: Negative for chest pain.   Gastrointestinal: Negative for abdominal pain.   Musculoskeletal: Positive for joint swelling (right ankle and foot).   Skin: Positive for color change (erythematous right lower leg) and wound (cat bite, right posterior leg).   All other systems reviewed and are negative.      Physical Exam   BP: 150/85  Pulse: 82  Heart Rate: 82  Temp: 97.2  F (36.2  C)  Resp: 16  Weight: 42.2 kg (93 lb)  SpO2: 97 %  Physical Exam   Constitutional: She is oriented to person, place, and time. She appears well-developed and well-nourished.  Non-toxic appearance. She does not appear ill. No distress.   HENT:   Head: Normocephalic and atraumatic.   Eyes: EOM are normal. Pupils are equal, round, and reactive to light.  No scleral icterus.   Neck: Normal range of motion. Neck supple.   Cardiovascular: Normal rate, regular rhythm, normal heart sounds and intact distal pulses.    No murmur heard.  Pulmonary/Chest: Effort normal and breath sounds normal. No respiratory distress.   Musculoskeletal:        Right lower leg: She exhibits tenderness and swelling. She exhibits no bony tenderness.        Legs:  Neurological: She is alert and oriented to person, place, and time. She has normal strength. No sensory deficit.   Skin: Skin is warm and dry. No rash noted. No pallor.   Psychiatric: She has a normal mood and affect. Her behavior is normal.   Nursing note and vitals reviewed.      ED Course     ED Course     Procedures               Assessments & Plan (with Medical Decision Making)   This patient presented to the emergency department with a swollen, infected leg from a cat bite she sustained a few days ago.  She is on outpatient clindamycin and doxycycline but it worsened despite this.  She was afebrile and did not appear toxic, but given her spread of infection despite outpatient antibiotics, I recommended that we admit her to the hospital for IV antibiotics and get laboratory studies and IV access.  She reported she did not want to go through this as she needed to get home and could not stay in the hospital.  I did explain to the patient that as the infection appears to be worsening despite outpatient antibiotics, there is a chance that this infection could spread, and cause her to become septic or even lose her leg.  Patient reports she will not stay in the hospital tonight despite this.  I discussed her reported penicillin allergy and she stated that she has taken penicillin before without problems.  She states that she does not remember anything happening when she has taken penicillin in the past.  For this reason I will place the patient on Augmentin and she was given her 1st dose here in the emergency department.  She was  instructed to return for fevers or worsening symptoms despite the change in antibiotics or any other problems.  She was up-to-date with regards to her tetanus shots and her cat was up to date with regards to its shots so I did not feel any immunizations were necessary.  At this point I would prefer that the patient be admitted for IV antibiotics but as the patient is declining this, I feel that switching antibiotics to see if the infection is more responsive to the Augmentin is warranted.  She was discharged in good condition.    This part of the document was transcribed by Deven Strong, Medical Scribe.       I have reviewed the nursing notes.    I have reviewed the findings, diagnosis, plan and need for follow up with the patient.    Discharge Medication List as of 9/8/2017  9:35 PM      START taking these medications    Details   amoxicillin-clavulanate (AUGMENTIN) 875-125 MG per tablet Take 1 tablet by mouth 2 times daily for 7 days, Disp-14 tablet, R-0, Local Print             Final diagnoses:   Infected cat bite, initial encounter   I, Deven Strong, am serving as a trained medical scribe to document services personally performed by José Curran MD, based on the provider's statements to me.      I, José Curran MD, was physically present and have reviewed and verified the accuracy of this note documented by Deven Strong.       9/8/2017   [facility], [place], EMERGENCY DEPARTMENT     José Curran MD  09/15/17 1167

## 2017-09-09 NOTE — DISCHARGE INSTRUCTIONS
Please make an appointment to follow up with Your Primary Care Provider as soon as possible.    Stop taking other antibiotics and start taking Augmentin as directed.    Return to the emergency department for fevers, worsening symptoms, or any other problems.

## 2017-09-13 ENCOUNTER — OFFICE VISIT (OUTPATIENT)
Dept: FAMILY MEDICINE | Facility: CLINIC | Age: 72
End: 2017-09-13
Payer: MEDICARE

## 2017-09-13 VITALS
DIASTOLIC BLOOD PRESSURE: 60 MMHG | BODY MASS INDEX: 18.9 KG/M2 | HEART RATE: 76 BPM | OXYGEN SATURATION: 98 % | WEIGHT: 92 LBS | TEMPERATURE: 98.3 F | SYSTOLIC BLOOD PRESSURE: 98 MMHG
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
## 2017-09-13 DIAGNOSIS — L03.115 CELLULITIS OF RIGHT LEG: Primary | ICD-10-CM

## 2017-09-13 LAB
BASOPHILS # BLD AUTO: 0 10E9/L (ref 0–0.2)
BASOPHILS NFR BLD AUTO: 0.2 %
CRP SERPL-MCNC: <2.9 MG/L (ref 0–8)
DIFFERENTIAL METHOD BLD: NORMAL
EOSINOPHIL # BLD AUTO: 0 10E9/L (ref 0–0.7)
EOSINOPHIL NFR BLD AUTO: 0.2 %
ERYTHROCYTE [DISTWIDTH] IN BLOOD BY AUTOMATED COUNT: 13.4 % (ref 10–15)
HCT VFR BLD AUTO: 39 % (ref 35–47)
HGB BLD-MCNC: 13 G/DL (ref 11.7–15.7)
LYMPHOCYTES # BLD AUTO: 2.8 10E9/L (ref 0.8–5.3)
LYMPHOCYTES NFR BLD AUTO: 28.6 %
MCH RBC QN AUTO: 31.6 PG (ref 26.5–33)
MCHC RBC AUTO-ENTMCNC: 33.3 G/DL (ref 31.5–36.5)
MCV RBC AUTO: 95 FL (ref 78–100)
MONOCYTES # BLD AUTO: 0.8 10E9/L (ref 0–1.3)
MONOCYTES NFR BLD AUTO: 7.9 %
NEUTROPHILS # BLD AUTO: 6.3 10E9/L (ref 1.6–8.3)
NEUTROPHILS NFR BLD AUTO: 63.1 %
PLATELET # BLD AUTO: 268 10E9/L (ref 150–450)
RBC # BLD AUTO: 4.12 10E12/L (ref 3.8–5.2)
WBC # BLD AUTO: 9.9 10E9/L (ref 4–11)

## 2017-09-13 PROCEDURE — 86140 C-REACTIVE PROTEIN: CPT | Performed by: FAMILY MEDICINE

## 2017-09-13 PROCEDURE — 99214 OFFICE O/P EST MOD 30 MIN: CPT | Performed by: FAMILY MEDICINE

## 2017-09-13 PROCEDURE — 85025 COMPLETE CBC W/AUTO DIFF WBC: CPT | Performed by: FAMILY MEDICINE

## 2017-09-13 PROCEDURE — 36415 COLL VENOUS BLD VENIPUNCTURE: CPT | Performed by: FAMILY MEDICINE

## 2017-09-13 RX ORDER — SULFAMETHOXAZOLE/TRIMETHOPRIM 800-160 MG
1 TABLET ORAL 2 TIMES DAILY
Qty: 20 TABLET | Refills: 0 | Status: SHIPPED | OUTPATIENT
Start: 2017-09-13 | End: 2017-09-23

## 2017-09-13 NOTE — MR AVS SNAPSHOT
"              After Visit Summary   9/13/2017    Digna Montana    MRN: [id number]           Patient Information     Date Of Birth          1945        Visit Information        Provider Department      9/13/2017 2:40 PM Larry Baptiste MD [facility]        Today's Diagnoses     Cellulitis of right leg    -  1    Cat bite, subsequent encounter          Care Instructions    Bactrim 1 tablet every 12 hours for 10 days. Please take with food and do not drink alcohol while on the antibiotics.   Follow up on 09/15/17 to recheck infection.           Follow-ups after your visit        Who to contact     If you have questions or need follow up information about today's clinic visit or your schedule please contact [facility] directly at [phone].  Normal or non-critical lab and imaging results will be communicated to you by MyChart, letter or phone within 4 business days after the clinic has received the results. If you do not hear from us within 7 days, please contact the clinic through MyChart or phone. If you have a critical or abnormal lab result, we will notify you by phone as soon as possible.  Submit refill requests through [employer] or call your pharmacy and they will forward the refill request to us. Please allow 3 business days for your refill to be completed.          Additional Information About Your Visit        MyChart Information     [employer] lets you send messages to your doctor, view your test results, renew your prescriptions, schedule appointments and more. To sign up, go to [email or web address] . Click on \"Log in\" on the left side of the screen, which will take you to the Welcome page. Then click on \"Sign up Now\" on the right side of the page.     You will be asked to enter the access code listed below, as well as some personal information. Please follow the directions to create your username and password.     Your access code is: [email or web address]  Expires: " 2017  9:28 AM     Your access code will  in 90 days. If you need help or a new code, please call your [facility] clinic or [phone].        Care EveryWhere ID     This is your Care EveryWhere ID. This could be used by other organizations to access your [facility] medical records  [id number]        Your Vitals Were     Pulse Temperature Pulse Oximetry BMI (Body Mass Index)          76 98.3  F (36.8  C) (Oral) 98% 18.9 kg/m2         Blood Pressure from Last 3 Encounters:   17 98/60   17 150/85   17 113/74    Weight from Last 3 Encounters:   17 92 lb (41.7 kg)   17 93 lb (42.2 kg)   17 93 lb 12 oz (42.5 kg)              We Performed the Following     CBC with platelets and differential     CRP, inflammation          Today's Medication Changes          These changes are accurate as of: 17  3:40 PM.  If you have any questions, ask your nurse or doctor.               Start taking these medicines.        Dose/Directions    sulfamethoxazole-trimethoprim 800-160 MG per tablet   Commonly known as:  BACTRIM DS/SEPTRA DS   Used for:  Cellulitis of right leg   Started by:  Larry Baptiste MD        Dose:  1 tablet   Take 1 tablet by mouth 2 times daily for 10 days   Quantity:  20 tablet   Refills:  0         Stop taking these medicines if you haven't already. Please contact your care team if you have questions.     amoxicillin-clavulanate 875-125 MG per tablet   Commonly known as:  AUGMENTIN   Stopped by:  Larry Baptiste MD                Where to get your medicines      These medications were sent to [facility] [address] [address] AT [address] [address]     Phone:  [phone]     sulfamethoxazole-trimethoprim 800-160 MG per tablet                Primary Care Provider Office Phone # Fax #    Simon eJsus Maddne [phone] [address] [facility] [address]         Equal Access to Services     [facility]: Hadii favian richard jolly English, wabenjaminda luqadaha, qaybta kadonavanelisa dillard, shilo peng . So [facility] [phone].    ATENCIÓN: Si habla español, tiene a peter disposición servicios gratuitos de asistencia lingüística. Angeline al [phone].    We comply with applicable federal civil rights laws and [place] laws. We do not discriminate on the basis of race, color, national origin, age, disability sex, sexual orientation or gender identity.            Thank you!     Thank you for choosing [facility]  for your care. Our goal is always to provide you with excellent care. Hearing back from our patients is one way we can continue to improve our services. Please take a few minutes to complete the written survey that you may receive in the mail after your visit with us. Thank you!             Your Updated Medication List - Protect others around you: Learn how to safely use, store and throw away your medicines at www.disposemymeds.org.          This list is accurate as of: 9/13/17  3:40 PM.  Always use your most recent med list.                   Brand Name Dispense Instructions for use Diagnosis    acetaminophen 325 MG tablet    TYLENOL    50 tablet    Take 1-2 tablets by mouth every 6 hours as needed.    Bronchitis with bronchospasm       ADVIL PO           * albuterol 108 (90 BASE) MCG/ACT Inhaler    PROAIR HFA/PROVENTIL HFA/VENTOLIN HFA    1 Inhaler    Inhale 2 puffs into the lungs every 4 hours as needed for shortness of breath / dyspnea    Chronic airway obstruction, not elsewhere classified       * albuterol (2.5 MG/3ML) 0.083% neb solution     1 Box    ONE NEBULIZATION 4 TIMES DAILY AS NEEDED    COPD (chronic obstructive pulmonary disease) (H)       amLODIPine 5 MG tablet    NORVASC    90 tablet    TAKE 1 TABLET(5 MG) BY MOUTH DAILY    Hypertension goal BP (blood pressure) < 140/90       aspirin 325 MG EC tablet     100 tablet     Take 1 tablet (325 mg) by mouth daily    Atherosclerotic occlusive disease       buPROPion 150 MG 12 hr tablet    WELLBUTRIN SR    180 tablet    TAKE 1 TABLET BY MOUTH TWICE DAILY    Tobacco use disorder       cyclobenzaprine 10 MG tablet    FLEXERIL    30 tablet    Take 0.5-1 tablets (5-10 mg) by mouth 3 times daily as needed for muscle spasms    Acute pain of right shoulder       fluticasone 50 MCG/ACT spray    FLONASE    16 g    Spray 2 sprays into both nostrils daily    Chronic rhinitis       HYDROcodone-acetaminophen 5-325 MG per tablet    NORCO    6 tablet    Take 1-2 tablets by mouth every 4 hours as needed for moderate to severe pain maximum 1 tablet(s) per day    Cat bite, initial encounter, Pain of right lower extremity       loratadine 10 MG tablet    CLARITIN    90 tablet    Take 1 tablet by mouth daily.    Allergic rhinitis, cause unspecified       omeprazole 20 MG tablet     90 tablet    Take 1 tablet (20 mg) by mouth daily    Esophageal reflux       simvastatin 40 MG tablet    ZOCOR    90 tablet    TAKE 1 TABLET(40 MG) BY MOUTH AT BEDTIME    Hyperlipidemia LDL goal <70       sulfamethoxazole-trimethoprim 800-160 MG per tablet    BACTRIM DS/SEPTRA DS    20 tablet    Take 1 tablet by mouth 2 times daily for 10 days    Cellulitis of right leg       SYMBICORT 160-4.5 MCG/ACT Inhaler   Generic drug:  budesonide-formoterol     3 Inhaler    Inhale 2 puffs into the lungs 2 times daily    COPD (chronic obstructive pulmonary disease) (H)       traZODone 50 MG tablet    DESYREL    270 tablet    TAKE 1 TO 3 TABLETS(50  MG) BY MOUTH EVERY NIGHT AS NEEDED FOR SLEEP    Insomnia, unspecified type       * Notice:  This list has 2 medication(s) that are the same as other medications prescribed for you. Read the directions carefully, and ask your doctor or other care provider to review them with you.

## 2017-09-13 NOTE — NURSING NOTE
"Chief Complaint   Patient presents with     RECHECK     Cat Bite       Initial BP 98/60  Pulse 76  Temp 98.3  F (36.8  C) (Oral)  Wt 92 lb (41.7 kg)  SpO2 98%  BMI 18.9 kg/m2 Estimated body mass index is 18.9 kg/(m^2) as calculated from the following:    Height as of 6/19/17: 4' 10.5\" (1.486 m).    Weight as of this encounter: 92 lb (41.7 kg).  Medication Reconciliation: complete     Tiffanie Gabriel MA      "

## 2017-09-13 NOTE — PATIENT INSTRUCTIONS
Bactrim 1 tablet every 12 hours for 10 days. Please take with food and do not drink alcohol while on the antibiotics.   Follow up on 09/15/17 to recheck infection.

## 2017-09-13 NOTE — PROGRESS NOTES
SUBJECTIVE:   Digna Montana is a 71 year old female who presents to clinic today for the following health issues:      Pt ran out of abx (augmentin) which showed have lasted on 09/15/17. She is unsure how she took the abx. She notes that she has more swelling, redness and pain. No fever or chills.     Problem list and histories reviewed & adjusted, as indicated.  Additional history: as documented    Labs reviewed in EPIC    Reviewed and updated as needed this visit by clinical staffTobacco  Allergies  Meds  Med Hx  Surg Hx  Fam Hx  Soc Hx      Reviewed and updated as needed this visit by Provider         ROS:  Constitutional, HEENT, cardiovascular, pulmonary, gi and gu systems are negative, except as otherwise noted.      OBJECTIVE:   BP 98/60  Pulse 76  Temp 98.3  F (36.8  C) (Oral)  Wt 92 lb (41.7 kg)  SpO2 98%  BMI 18.9 kg/m2  Body mass index is 18.9 kg/(m^2).  GENERAL: healthy, alert and no distress  MS/SKIN: right leg posterior with healing scab with surrounding mild erythema, tenderness and edema    Diagnostic Test Results:  Results for orders placed or performed in visit on 09/13/17 (from the past 24 hour(s))   CBC with platelets and differential   Result Value Ref Range    WBC 9.9 4.0 - 11.0 10e9/L    RBC Count 4.12 3.8 - 5.2 10e12/L    Hemoglobin 13.0 11.7 - 15.7 g/dL    Hematocrit 39.0 35.0 - 47.0 %    MCV 95 78 - 100 fl    MCH 31.6 26.5 - 33.0 pg    MCHC 33.3 31.5 - 36.5 g/dL    RDW 13.4 10.0 - 15.0 %    Platelet Count 268 150 - 450 10e9/L    Diff Method Automated Method     % Neutrophils 63.1 %    % Lymphocytes 28.6 %    % Monocytes 7.9 %    % Eosinophils 0.2 %    % Basophils 0.2 %    Absolute Neutrophil 6.3 1.6 - 8.3 10e9/L    Absolute Lymphocytes 2.8 0.8 - 5.3 10e9/L    Absolute Monocytes 0.8 0.0 - 1.3 10e9/L    Absolute Eosinophils 0.0 0.0 - 0.7 10e9/L    Absolute Basophils 0.0 0.0 - 0.2 10e9/L       ASSESSMENT/PLAN:       ## Cellulitis of right leg, Cat bite, subsequent encounter  - Pts  friend is with her in clinic, friend notes that pt could be drinking heavily and subsequently having memory problems. That's why she isn't taking her abx properly. I obtained CBC which showed no leukocytosis. CRP is pending. She was on doxycyline and clindamycin. Symptoms weren't improving and she was switched to Augmentin. Per pt she was given a few pills and has already finished them. Due to continued cellulitis, I have prescribed Bactrim BID X 10 days. I encouraged pt to not drink while on antibiotics and follow up with myself on 09/15/17 to recheck leg. Also recommended to elevate leg while at home.   - CBC with platelets and differential  - CRP, inflammation  - sulfamethoxazole-trimethoprim (BACTRIM DS/SEPTRA DS) 800-160 MG per tablet; Take 1 tablet by mouth 2 times daily for 10 days  Dispense: 20 tablet; Refill: 0      Deqa Ildefonso Baptiste MD  [facility]

## 2017-09-15 ENCOUNTER — OFFICE VISIT (OUTPATIENT)
Dept: FAMILY MEDICINE | Facility: CLINIC | Age: 72
End: 2017-09-15
Payer: MEDICARE

## 2017-09-15 VITALS
DIASTOLIC BLOOD PRESSURE: 55 MMHG | OXYGEN SATURATION: 96 % | SYSTOLIC BLOOD PRESSURE: 111 MMHG | HEART RATE: 71 BPM | TEMPERATURE: 98.1 F

## 2017-09-15 DIAGNOSIS — L03.115 CELLULITIS OF RIGHT LEG: Primary | ICD-10-CM

## 2017-09-15 PROCEDURE — 99213 OFFICE O/P EST LOW 20 MIN: CPT | Performed by: FAMILY MEDICINE

## 2017-09-15 NOTE — MR AVS SNAPSHOT
"              After Visit Summary   9/15/2017    Digna Montana    MRN: [id number]           Patient Information     Date Of Birth          1945        Visit Information        Provider Department      9/15/2017 2:40 PM Larry Baptiste MD [facility]        Today's Diagnoses     Cellulitis of right leg    -  1       Follow-ups after your visit        Who to contact     If you have questions or need follow up information about today's clinic visit or your schedule please contact [facility] directly at [phone].  Normal or non-critical lab and imaging results will be communicated to you by Itivahart, letter or phone within 4 business days after the clinic has received the results. If you do not hear from us within 7 days, please contact the clinic through Itivahart or phone. If you have a critical or abnormal lab result, we will notify you by phone as soon as possible.  Submit refill requests through [employer] or call your pharmacy and they will forward the refill request to us. Please allow 3 business days for your refill to be completed.          Additional Information About Your Visit        MyChart Information     [employer] lets you send messages to your doctor, view your test results, renew your prescriptions, schedule appointments and more. To sign up, go to [email or web address] . Click on \"Log in\" on the left side of the screen, which will take you to the Welcome page. Then click on \"Sign up Now\" on the right side of the page.     You will be asked to enter the access code listed below, as well as some personal information. Please follow the directions to create your username and password.     Your access code is: [email or web address]  Expires: 2017  9:28 AM     Your access code will  in 90 days. If you need help or a new code, please call your [facility] or [phone].        Care EveryWhere ID     This is your Care EveryWhere ID. This could be used by other " organizations to access your [place] medical records  [id number]        Your Vitals Were     Pulse Temperature Pulse Oximetry             71 98.1  F (36.7  C) (Oral) 96%          Blood Pressure from Last 3 Encounters:   09/15/17 111/55   09/13/17 98/60   09/08/17 150/85    Weight from Last 3 Encounters:   09/13/17 92 lb (41.7 kg)   09/08/17 93 lb (42.2 kg)   09/07/17 93 lb 12 oz (42.5 kg)              Today, you had the following     No orders found for display       Primary Care Provider Office Phone # Fax #    Simon Jesus Madden [phone] [address] [place], [place] [id number]        Equal Access to Services     [facility]STANT[facility] : Bob fraizero Sonikkie, waaxda luqadaha, qaybta kaalmada adeegyada, shilo barkley. So [facility] [phone].    ATENCIÓN: Si habla español, tiene a peter disposición servicios gratuitos de asistencia lingüística. Llame al [phone].    We comply with applicable federal civil rights laws and [place] laws. We do not discriminate on the basis of race, color, national origin, age, disability sex, sexual orientation or gender identity.            Thank you!     Thank you for choosing [facility]  for your care. Our goal is always to provide you with excellent care. Hearing back from our patients is one way we can continue to improve our services. Please take a few minutes to complete the written survey that you may receive in the mail after your visit with us. Thank you!             Your Updated Medication List - Protect others around you: Learn how to safely use, store and throw away your medicines at www.disposemymeds.org.          This list is accurate as of: 9/15/17  3:33 PM.  Always use your most recent med list.                   Brand Name Dispense Instructions for use Diagnosis    acetaminophen 325 MG tablet    TYLENOL    50 tablet    Take 1-2 tablets by mouth every 6 hours as needed.    Bronchitis with  bronchospasm       ADVIL PO           * albuterol 108 (90 BASE) MCG/ACT Inhaler    PROAIR HFA/PROVENTIL HFA/VENTOLIN HFA    1 Inhaler    Inhale 2 puffs into the lungs every 4 hours as needed for shortness of breath / dyspnea    Chronic airway obstruction, not elsewhere classified       * albuterol (2.5 MG/3ML) 0.083% neb solution     1 Box    ONE NEBULIZATION 4 TIMES DAILY AS NEEDED    COPD (chronic obstructive pulmonary disease) (H)       amLODIPine 5 MG tablet    NORVASC    90 tablet    TAKE 1 TABLET(5 MG) BY MOUTH DAILY    Hypertension goal BP (blood pressure) < 140/90       aspirin 325 MG EC tablet     100 tablet    Take 1 tablet (325 mg) by mouth daily    Atherosclerotic occlusive disease       buPROPion 150 MG 12 hr tablet    WELLBUTRIN SR    180 tablet    TAKE 1 TABLET BY MOUTH TWICE DAILY    Tobacco use disorder       cyclobenzaprine 10 MG tablet    FLEXERIL    30 tablet    Take 0.5-1 tablets (5-10 mg) by mouth 3 times daily as needed for muscle spasms    Acute pain of right shoulder       fluticasone 50 MCG/ACT spray    FLONASE    16 g    Spray 2 sprays into both nostrils daily    Chronic rhinitis       HYDROcodone-acetaminophen 5-325 MG per tablet    NORCO    6 tablet    Take 1-2 tablets by mouth every 4 hours as needed for moderate to severe pain maximum 1 tablet(s) per day    Cat bite, initial encounter, Pain of right lower extremity       loratadine 10 MG tablet    CLARITIN    90 tablet    Take 1 tablet by mouth daily.    Allergic rhinitis, cause unspecified       omeprazole 20 MG tablet     90 tablet    Take 1 tablet (20 mg) by mouth daily    Esophageal reflux       simvastatin 40 MG tablet    ZOCOR    90 tablet    TAKE 1 TABLET(40 MG) BY MOUTH AT BEDTIME    Hyperlipidemia LDL goal <70       sulfamethoxazole-trimethoprim 800-160 MG per tablet    BACTRIM DS/SEPTRA DS    20 tablet    Take 1 tablet by mouth 2 times daily for 10 days    Cellulitis of right leg       SYMBICORT 160-4.5 MCG/ACT Inhaler    Generic drug:  budesonide-formoterol     3 Inhaler    Inhale 2 puffs into the lungs 2 times daily    COPD (chronic obstructive pulmonary disease) (H)       traZODone 50 MG tablet    DESYREL    270 tablet    TAKE 1 TO 3 TABLETS(50  MG) BY MOUTH EVERY NIGHT AS NEEDED FOR SLEEP    Insomnia, unspecified type       * Notice:  This list has 2 medication(s) that are the same as other medications prescribed for you. Read the directions carefully, and ask your doctor or other care provider to review them with you.

## 2017-09-15 NOTE — NURSING NOTE
"Chief Complaint   Patient presents with     RECHECK       Initial /55 (BP Location: Right arm, Patient Position: Chair, Cuff Size: Adult Regular)  Pulse 71  Temp 98.1  F (36.7  C) (Oral)  SpO2 96% Estimated body mass index is 18.9 kg/(m^2) as calculated from the following:    Height as of 6/19/17: 4' 10.5\" (1.486 m).    Weight as of 9/13/17: 92 lb (41.7 kg).  Medication Reconciliation: complete     Tiffanie Gabriel MA      "

## 2017-09-15 NOTE — PROGRESS NOTES
SUBJECTIVE:   Digna Montana is a 71 year old female who presents to clinic today for the following health issues:      Pt has been compliant with bactrim. Notes pills are horse size, but she cuts them. She only has pain when she touches the leg. Redness has improved. Mild swelling still present.     Problem list and histories reviewed & adjusted, as indicated.  Additional history: as documented    Labs reviewed in EPIC    Reviewed and updated as needed this visit by clinical staffTobacco  Allergies  Meds  Med Hx  Surg Hx  Fam Hx  Soc Hx      Reviewed and updated as needed this visit by Provider         ROS:  Constitutional, HEENT, cardiovascular, pulmonary, gi and gu systems are negative, except as otherwise noted.      OBJECTIVE:   /55 (BP Location: Right arm, Patient Position: Chair, Cuff Size: Adult Regular)  Pulse 71  Temp 98.1  F (36.7  C) (Oral)  SpO2 96%  There is no height or weight on file to calculate BMI.  GENERAL: healthy, alert and no distress  MS/SKIN: right lower extremity with 1+ edema and trace erythema     Diagnostic Test Results:  Results for orders placed or performed in visit on 09/13/17   CBC with platelets and differential   Result Value Ref Range    WBC 9.9 4.0 - 11.0 10e9/L    RBC Count 4.12 3.8 - 5.2 10e12/L    Hemoglobin 13.0 11.7 - 15.7 g/dL    Hematocrit 39.0 35.0 - 47.0 %    MCV 95 78 - 100 fl    MCH 31.6 26.5 - 33.0 pg    MCHC 33.3 31.5 - 36.5 g/dL    RDW 13.4 10.0 - 15.0 %    Platelet Count 268 150 - 450 10e9/L    Diff Method Automated Method     % Neutrophils 63.1 %    % Lymphocytes 28.6 %    % Monocytes 7.9 %    % Eosinophils 0.2 %    % Basophils 0.2 %    Absolute Neutrophil 6.3 1.6 - 8.3 10e9/L    Absolute Lymphocytes 2.8 0.8 - 5.3 10e9/L    Absolute Monocytes 0.8 0.0 - 1.3 10e9/L    Absolute Eosinophils 0.0 0.0 - 0.7 10e9/L    Absolute Basophils 0.0 0.0 - 0.2 10e9/L   CRP, inflammation   Result Value Ref Range    CRP Inflammation <2.9 0.0 - 8.0 mg/L        ASSESSMENT/PLAN:       1. Cellulitis of right leg  - symptoms improving continue current abx  - elevate legs when at home  - right leg wrapped with ACE wrap, recommended to use during the day and take off at night  - Follow if symptoms worsen or fail to improve.      Larry Baptiste MD  [facility]

## 2017-11-14 ENCOUNTER — OFFICE VISIT (OUTPATIENT)
Dept: FAMILY MEDICINE | Facility: CLINIC | Age: 72
End: 2017-11-14
Payer: MEDICARE

## 2017-11-14 VITALS
OXYGEN SATURATION: 98 % | SYSTOLIC BLOOD PRESSURE: 120 MMHG | BODY MASS INDEX: 19.72 KG/M2 | DIASTOLIC BLOOD PRESSURE: 68 MMHG | HEART RATE: 90 BPM | TEMPERATURE: 98.6 F | WEIGHT: 96 LBS

## 2017-11-14 DIAGNOSIS — J42 CHRONIC BRONCHITIS, UNSPECIFIED CHRONIC BRONCHITIS TYPE (H): ICD-10-CM

## 2017-11-14 DIAGNOSIS — E78.5 HYPERLIPIDEMIA LDL GOAL <70: ICD-10-CM

## 2017-11-14 DIAGNOSIS — M79.18 PAIN IN RIGHT BUTTOCK: Primary | ICD-10-CM

## 2017-11-14 DIAGNOSIS — F17.200 TOBACCO USE DISORDER: ICD-10-CM

## 2017-11-14 DIAGNOSIS — J30.89 CHRONIC NONSEASONAL ALLERGIC RHINITIS DUE TO POLLEN: ICD-10-CM

## 2017-11-14 DIAGNOSIS — J30.9 CHRONIC ALLERGIC RHINITIS, UNSPECIFIED SEASONALITY, UNSPECIFIED TRIGGER: ICD-10-CM

## 2017-11-14 DIAGNOSIS — G47.00 INSOMNIA, UNSPECIFIED TYPE: ICD-10-CM

## 2017-11-14 PROCEDURE — 99213 OFFICE O/P EST LOW 20 MIN: CPT | Performed by: FAMILY MEDICINE

## 2017-11-14 RX ORDER — BUPROPION HYDROCHLORIDE 150 MG/1
150 TABLET, EXTENDED RELEASE ORAL 2 TIMES DAILY
Qty: 60 TABLET | Refills: 0 | Status: SHIPPED | OUTPATIENT
Start: 2017-11-14 | End: 2017-12-12

## 2017-11-14 RX ORDER — SIMVASTATIN 40 MG
TABLET ORAL
Qty: 90 TABLET | Refills: 0 | Status: SHIPPED | OUTPATIENT
Start: 2017-11-14 | End: 2017-12-12

## 2017-11-14 RX ORDER — BUDESONIDE AND FORMOTEROL FUMARATE DIHYDRATE 160; 4.5 UG/1; UG/1
2 AEROSOL RESPIRATORY (INHALATION) 2 TIMES DAILY
Qty: 1 INHALER | Refills: 0 | Status: SHIPPED | OUTPATIENT
Start: 2017-11-14 | End: 2018-11-16

## 2017-11-14 RX ORDER — LORATADINE 10 MG/1
10 TABLET ORAL DAILY
Qty: 30 TABLET | Refills: 0 | Status: SHIPPED | OUTPATIENT
Start: 2017-11-14 | End: 2018-11-16

## 2017-11-14 RX ORDER — TRAZODONE HYDROCHLORIDE 50 MG/1
50 TABLET, FILM COATED ORAL
Qty: 30 TABLET | Refills: 0 | Status: SHIPPED | OUTPATIENT
Start: 2017-11-14 | End: 2018-11-16

## 2017-11-14 RX ORDER — FLUTICASONE PROPIONATE 50 MCG
2 SPRAY, SUSPENSION (ML) NASAL DAILY
Qty: 16 G | Refills: 6 | Status: SHIPPED | OUTPATIENT
Start: 2017-11-14 | End: 2018-11-16

## 2017-11-14 RX ORDER — ALBUTEROL SULFATE 90 UG/1
2 AEROSOL, METERED RESPIRATORY (INHALATION) EVERY 4 HOURS PRN
Qty: 1 INHALER | Refills: 0 | Status: SHIPPED | OUTPATIENT
Start: 2017-11-14

## 2017-11-14 RX ORDER — IBUPROFEN 200 MG
200 TABLET ORAL 3 TIMES DAILY
Qty: 21 TABLET | Refills: 0 | Status: SHIPPED | OUTPATIENT
Start: 2017-11-14 | End: 2017-11-21

## 2017-11-14 RX ORDER — CYCLOBENZAPRINE HCL 10 MG
5-10 TABLET ORAL
Qty: 30 TABLET | Refills: 0 | Status: SHIPPED | OUTPATIENT
Start: 2017-11-14 | End: 2018-11-16

## 2017-11-14 NOTE — MR AVS SNAPSHOT
After Visit Summary   11/14/2017    Digna Montana    MRN: [id number]           Patient Information     Date Of Birth          1945        Visit Information        Provider Department      11/14/2017 12:20 PM Wegener, Joel Daniel Irwin, MD [facility]        Today's Diagnoses     Pain in right buttock    -  1    Tobacco use disorder        Hyperlipidemia LDL goal <70        Insomnia, unspecified type        Chronic bronchitis, unspecified chronic bronchitis type (H)        Chronic nonseasonal allergic rhinitis due to pollen        Chronic allergic rhinitis, unspecified seasonality, unspecified trigger          Care Instructions    ASSESSMENT AND PLAN  1. Pain in right buttock  Overall appears to be from muscle spasm.     Treat with increasing ibuprofen to one tablet three times daily for one week, rest and continue warm packs.     I recommend using cyclobenzaprine at night only to decrease spasm.  DO NOT USE WITH ALCOHOL.        - cyclobenzaprine (FLEXERIL) 10 MG tablet; Take 0.5-1 tablets (5-10 mg) by mouth nightly as needed for muscle spasms  Dispense: 30 tablet; Refill: 0  - ibuprofen (ADVIL/MOTRIN) 200 MG tablet; Take 1 tablet (200 mg) by mouth 3 times daily for 7 days  Dispense: 21 tablet; Refill: 0    2. Tobacco use disorder    - buPROPion (WELLBUTRIN SR) 150 MG 12 hr tablet; Take 1 tablet (150 mg) by mouth 2 times daily  Dispense: 60 tablet; Refill: 0    3. Hyperlipidemia LDL goal <70    - simvastatin (ZOCOR) 40 MG tablet; TAKE 1 TABLET(40 MG) BY MOUTH AT BEDTIME  Dispense: 90 tablet; Refill: 0    4. Insomnia, unspecified type    - traZODone (DESYREL) 50 MG tablet; Take 1 tablet (50 mg) by mouth nightly as needed for sleep  Dispense: 30 tablet; Refill: 0    5. Chronic bronchitis, unspecified chronic bronchitis type (H)    - budesonide-formoterol (SYMBICORT) 160-4.5 MCG/ACT Inhaler; Inhale 2 puffs into the lungs 2 times daily  Dispense: 1 Inhaler; Refill: 0  - albuterol  (PROAIR HFA/PROVENTIL HFA/VENTOLIN HFA) 108 (90 BASE) MCG/ACT Inhaler; Inhale 2 puffs into the lungs every 4 hours as needed for shortness of breath / dyspnea  Dispense: 1 Inhaler; Refill: 0    6. Chronic nonseasonal allergic rhinitis due to pollen    - loratadine (CLARITIN) 10 MG tablet; Take 1 tablet (10 mg) by mouth daily  Dispense: 30 tablet; Refill: 0    7. Chronic allergic rhinitis, unspecified seasonality, unspecified trigger    - fluticasone (FLONASE) 50 MCG/ACT spray; Spray 2 sprays into both nostrils daily  Dispense: 16 g; Refill: 6    please put in sharlene crawford for a preventive physical with dr. purcell for 1:20 on december 12th      [employer] SIGNUP FOR E-VISITS AND EASIER COMMUNICATION:  [email or web address]     Zipnosis:  [place].[employer].  Sign up for e-visits for common illnesses!     RADIOLOGY:   [facility]:  [phone] to schedule any radiology tests at [facility]: [phone]    Mammograms/colonoscopies:  [phone]    CONSUMER PRICE LINE for estimates of test costs:  [phone]               Follow-ups after your visit        Who to contact     If you have questions or need follow up information about today's clinic visit or your schedule please contact [facility] directly at [phone].  Normal or non-critical lab and imaging results will be communicated to you by [employer], letter or phone within 4 business days after the clinic has received the results. If you do not hear from us within 7 days, please contact the clinic through [employer] or phone. If you have a critical or abnormal lab result, we will notify you by phone as soon as possible.  Submit refill requests through [email or web address] or call your pharmacy and they will forward the refill request to us. Please allow 3 business days for your refill to be completed.          Additional Information About Your Visit        [email or web address] Information     [email or web address] lets you send messages to your doctor,  "view your test results, renew your prescriptions, schedule appointments and more. To sign up, go to [email or web address] . Click on \"Log in\" on the left side of the screen, which will take you to the Welcome page. Then click on \"Sign up Now\" on the right side of the page.     You will be asked to enter the access code listed below, as well as some personal information. Please follow the directions to create your username and password.     Your access code is: [email or web address]  Expires: 2018  1:01 PM     Your access code will  in 90 days. If you need help or a new code, please call your [place] clinic or [phone].        Care EveryWhere ID     This is your Care EveryWhere ID. This could be used by other organizations to access your [place] medical records  [id number]        Your Vitals Were     Pulse Temperature Pulse Oximetry BMI (Body Mass Index)          90 98.6  F (37  C) (Oral) 98% 19.72 kg/m2         Blood Pressure from Last 3 Encounters:   17 120/68   09/15/17 111/55   17 98/60    Weight from Last 3 Encounters:   17 96 lb (43.5 kg)   17 92 lb (41.7 kg)   17 93 lb (42.2 kg)              Today, you had the following     No orders found for display         Today's Medication Changes          These changes are accurate as of: 17  1:01 PM.  If you have any questions, ask your nurse or doctor.               These medicines have changed or have updated prescriptions.        Dose/Directions    albuterol 108 (90 BASE) MCG/ACT Inhaler   Commonly known as:  PROAIR HFA/PROVENTIL HFA/VENTOLIN HFA   This may have changed:  Another medication with the same name was removed. Continue taking this medication, and follow the directions you see here.   Used for:  Chronic bronchitis, unspecified chronic bronchitis type (H)   Changed by:  Wegener, Joel Daniel Irwin, MD        Dose:  2 puff   Inhale 2 puffs into the lungs every 4 hours as needed for shortness of breath / dyspnea "   Quantity:  1 Inhaler   Refills:  0       buPROPion 150 MG 12 hr tablet   Commonly known as:  WELLBUTRIN SR   This may have changed:  See the new instructions.   Used for:  Tobacco use disorder   Changed by:  Wegener, Joel Daniel Irwin, MD        Dose:  150 mg   Take 1 tablet (150 mg) by mouth 2 times daily   Quantity:  60 tablet   Refills:  0       cyclobenzaprine 10 MG tablet   Commonly known as:  FLEXERIL   This may have changed:  when to take this   Used for:  Pain in right buttock   Changed by:  Wegener, Joel Daniel Irwin, MD        Dose:  5-10 mg   Take 0.5-1 tablets (5-10 mg) by mouth nightly as needed for muscle spasms   Quantity:  30 tablet   Refills:  0       ibuprofen 200 MG tablet   Commonly known as:  ADVIL/MOTRIN   This may have changed:    - medication strength  - how much to take  - when to take this   Used for:  Pain in right buttock   Changed by:  Wegener, Joel Daniel Irwin, MD        Dose:  200 mg   Take 1 tablet (200 mg) by mouth 3 times daily for 7 days   Quantity:  21 tablet   Refills:  0       simvastatin 40 MG tablet   Commonly known as:  ZOCOR   This may have changed:  See the new instructions.   Used for:  Hyperlipidemia LDL goal <70   Changed by:  Wegener, Joel Daniel Irwin, MD        TAKE 1 TABLET(40 MG) BY MOUTH AT BEDTIME   Quantity:  90 tablet   Refills:  0       traZODone 50 MG tablet   Commonly known as:  DESYREL   This may have changed:    - how much to take  - how to take this  - when to take this  - reasons to take this  - additional instructions   Used for:  Insomnia, unspecified type   Changed by:  Wegener, Joel Daniel Irwin, MD        Dose:  50 mg   Take 1 tablet (50 mg) by mouth nightly as needed for sleep   Quantity:  30 tablet   Refills:  0         Stop taking these medicines if you haven't already. Please contact your care team if you have questions.     acetaminophen 325 MG tablet   Commonly known as:  TYLENOL   Stopped by:  Wegener, Joel Daniel Irwin, MD            HYDROcodone-acetaminophen 5-325 MG per tablet   Commonly known as:  NORCO   Stopped by:  Wegener, Joel Daniel Irwin, MD           omeprazole 20 MG tablet   Stopped by:  Wegener, Joel Daniel Irwin, MD                Where to get your medicines      These medications were sent to [facility] [address] AT [address] [address]     Phone:  [phone]     albuterol 108 (90 BASE) MCG/ACT Inhaler    budesonide-formoterol 160-4.5 MCG/ACT Inhaler    buPROPion 150 MG 12 hr tablet    cyclobenzaprine 10 MG tablet    fluticasone 50 MCG/ACT spray    ibuprofen 200 MG tablet    loratadine 10 MG tablet    simvastatin 40 MG tablet    traZODone 50 MG tablet                Primary Care Provider Office Phone # Fax #    Simon Jesus Madden [phone] [address] [place] [address]        Equal Access to Services     GWENDOLYN [facility] HealthJOSEPH : Hadii aad ku hadasho Soomaali, waaxda luqadaha, qaybta kaalmada adeegyada, waxay idiin hayaan diana peng . So [facility] [phone].    ATENCIÓN: Si habla español, tiene a peter disposición servicios gratuitos de asistencia lingüística. [facility] [phone].    We comply with applicable federal civil rights laws and [place] laws. We do not discriminate on the basis of race, color, national origin, age, disability, sex, sexual orientation, or gender identity.            Thank you!     Thank you for choosing [facility]  for your care. Our goal is always to provide you with excellent care. Hearing back from our patients is one way we can continue to improve our services. Please take a few minutes to complete the written survey that you may receive in the mail after your visit with us. Thank you!             Your Updated Medication List - Protect others around you: Learn how to safely use, store and throw away your medicines at www.disposemymeds.org.          This list is  accurate as of: 11/14/17  1:01 PM.  Always use your most recent med list.                   Brand Name Dispense Instructions for use Diagnosis    albuterol 108 (90 BASE) MCG/ACT Inhaler    PROAIR HFA/PROVENTIL HFA/VENTOLIN HFA    1 Inhaler    Inhale 2 puffs into the lungs every 4 hours as needed for shortness of breath / dyspnea    Chronic bronchitis, unspecified chronic bronchitis type (H)       amLODIPine 5 MG tablet    NORVASC    90 tablet    TAKE 1 TABLET(5 MG) BY MOUTH DAILY    Hypertension goal BP (blood pressure) < 140/90       aspirin 325 MG EC tablet     100 tablet    Take 1 tablet (325 mg) by mouth daily    Atherosclerotic occlusive disease       budesonide-formoterol 160-4.5 MCG/ACT Inhaler    SYMBICORT    1 Inhaler    Inhale 2 puffs into the lungs 2 times daily    Chronic bronchitis, unspecified chronic bronchitis type (H)       buPROPion 150 MG 12 hr tablet    WELLBUTRIN SR    60 tablet    Take 1 tablet (150 mg) by mouth 2 times daily    Tobacco use disorder       cyclobenzaprine 10 MG tablet    FLEXERIL    30 tablet    Take 0.5-1 tablets (5-10 mg) by mouth nightly as needed for muscle spasms    Pain in right buttock       fluticasone 50 MCG/ACT spray    FLONASE    16 g    Spray 2 sprays into both nostrils daily    Chronic allergic rhinitis, unspecified seasonality, unspecified trigger       ibuprofen 200 MG tablet    ADVIL/MOTRIN    21 tablet    Take 1 tablet (200 mg) by mouth 3 times daily for 7 days    Pain in right buttock       loratadine 10 MG tablet    CLARITIN    30 tablet    Take 1 tablet (10 mg) by mouth daily    Chronic nonseasonal allergic rhinitis due to pollen       simvastatin 40 MG tablet    ZOCOR    90 tablet    TAKE 1 TABLET(40 MG) BY MOUTH AT BEDTIME    Hyperlipidemia LDL goal <70       traZODone 50 MG tablet    DESYREL    30 tablet    Take 1 tablet (50 mg) by mouth nightly as needed for sleep    Insomnia, unspecified type

## 2017-11-14 NOTE — PATIENT INSTRUCTIONS
ASSESSMENT AND PLAN  1. Pain in right buttock  Overall appears to be from muscle spasm.     Treat with increasing ibuprofen to one tablet three times daily for one week, rest and continue warm packs.     I recommend using cyclobenzaprine at night only to decrease spasm.  DO NOT USE WITH ALCOHOL.        - cyclobenzaprine (FLEXERIL) 10 MG tablet; Take 0.5-1 tablets (5-10 mg) by mouth nightly as needed for muscle spasms  Dispense: 30 tablet; Refill: 0  - ibuprofen (ADVIL/MOTRIN) 200 MG tablet; Take 1 tablet (200 mg) by mouth 3 times daily for 7 days  Dispense: 21 tablet; Refill: 0    2. Tobacco use disorder    - buPROPion (WELLBUTRIN SR) 150 MG 12 hr tablet; Take 1 tablet (150 mg) by mouth 2 times daily  Dispense: 60 tablet; Refill: 0    3. Hyperlipidemia LDL goal <70    - simvastatin (ZOCOR) 40 MG tablet; TAKE 1 TABLET(40 MG) BY MOUTH AT BEDTIME  Dispense: 90 tablet; Refill: 0    4. Insomnia, unspecified type    - traZODone (DESYREL) 50 MG tablet; Take 1 tablet (50 mg) by mouth nightly as needed for sleep  Dispense: 30 tablet; Refill: 0    5. Chronic bronchitis, unspecified chronic bronchitis type (H)    - budesonide-formoterol (SYMBICORT) 160-4.5 MCG/ACT Inhaler; Inhale 2 puffs into the lungs 2 times daily  Dispense: 1 Inhaler; Refill: 0  - albuterol (PROAIR HFA/PROVENTIL HFA/VENTOLIN HFA) 108 (90 BASE) MCG/ACT Inhaler; Inhale 2 puffs into the lungs every 4 hours as needed for shortness of breath / dyspnea  Dispense: 1 Inhaler; Refill: 0    6. Chronic nonseasonal allergic rhinitis due to pollen    - loratadine (CLARITIN) 10 MG tablet; Take 1 tablet (10 mg) by mouth daily  Dispense: 30 tablet; Refill: 0    7. Chronic allergic rhinitis, unspecified seasonality, unspecified trigger    - fluticasone (FLONASE) 50 MCG/ACT spray; Spray 2 sprays into both nostrils daily  Dispense: 16 g; Refill: 6    please put in sharlene crawford for a preventive physical with dr. purcell for 1:20 on december 12th      [facility] SIGNUP FOR E-VISITS  AND EASIER COMMUNICATION:  [email or web address]     Zipnosis:  [place].[employer].  Sign up for e-visits for common illnesses!     RADIOLOGY:   [facility]:  [phone] to schedule any radiology tests at [facility]: [phone]    Mammograms/colonoscopies:  [phone]    CONSUMER PRICE LINE for estimates of test costs:  [phone]

## 2017-11-14 NOTE — NURSING NOTE
"Chief Complaint   Patient presents with     Musculoskeletal Problem       Initial /68  Pulse 90  Temp 98.6  F (37  C) (Oral)  Wt 96 lb (43.5 kg)  SpO2 98%  BMI 19.72 kg/m2 Estimated body mass index is 19.72 kg/(m^2) as calculated from the following:    Height as of 6/19/17: 4' 10.5\" (1.486 m).    Weight as of this encounter: 96 lb (43.5 kg).  Medication Reconciliation: complete   Jeremy Rahman CMA   "

## 2017-11-14 NOTE — PROGRESS NOTES
SUBJECTIVE:   Digna Montana is a 71 year old female who presents to clinic today for the following health issues:      Right Hip Pain  x2d  Pain: Severe  Accompanying signs and symptoms: radiation of pain to right side of hip and buttocks. . Heat seemed to help. Ibuprofen doesn't seem to help.     Additional history/life update:       Pain in right buttock: here with daughter who lives close.  No falls, no trauma.  Pain right hip/buttocks for two days.  No back pain.  Seems to be worse with walking.  Feels sore.  Not electric but does seem to radiate.   Needing refills for other chonic conditions.  Usually sees dr. Madden.  Daughter brings up alcohol use when I mention baclofen is sedating.  Pt denies heavy drinking.          Problem list, Medication list, Allergies, and Medical/Social/Surgical histories reviewed in [facility] and updated as appropriate.  Labs reviewed in EPIC  BP Readings from Last 3 Encounters:   17 120/68   09/15/17 111/55   17 98/60    Wt Readings from Last 3 Encounters:   17 96 lb (43.5 kg)   17 92 lb (41.7 kg)   17 93 lb (42.2 kg)                  Patient Active Problem List   Diagnosis     Allergic rhinitis     Tobacco use disorder     Insomnia     Major depressive disorder, recurrent episode, moderate     Esophageal reflux     Generalized osteoarthrosis, unspecified site     COPD (chronic obstructive pulmonary disease) (H)     Stress reaction of bone     Back pain     Hiatal hernia     Advanced directives, counseling/discussion     Anxiety     CARDIOVASCULAR SCREENING; LDL GOAL LESS THAN 70     Acute right MCA stroke (H)     Intracranial vascular stenosis     Carotid disease, bilateral (H)     Mixed hyperlipidemia     Post herpetic neuralgia     Alcohol dependence (H)     Hypertension goal BP (blood pressure) < 140/90     Past Surgical History:   Procedure Laterality Date     C NONSPECIFIC PROCEDURE           C NONSPECIFIC PROCEDURE       cholecystectomy     C NONSPECIFIC PROCEDURE  11/04    left distal radius fx       Social History   Substance Use Topics     Smoking status: Current Every Day Smoker     Types: Cigarettes     Smokeless tobacco: Current User      Comment: is using patch/quit plan     Alcohol use Yes      Comment: 2 drinks 3 times weekly     Family History   Problem Relation Age of Onset     GASTROINTESTINAL DISEASE Mother      pancreatitis     C.A.D. Father      mi in 70's     Genetic Disorder Sister      downs     DIABETES No family hx of      Hypertension No family hx of      CEREBROVASCULAR DISEASE No family hx of      Breast Cancer No family hx of      Cancer - colorectal No family hx of      Prostate Cancer No family hx of          Current Outpatient Prescriptions   Medication Sig Dispense Refill     cyclobenzaprine (FLEXERIL) 10 MG tablet Take 0.5-1 tablets (5-10 mg) by mouth nightly as needed for muscle spasms 30 tablet 0     buPROPion (WELLBUTRIN SR) 150 MG 12 hr tablet Take 1 tablet (150 mg) by mouth 2 times daily 60 tablet 0     simvastatin (ZOCOR) 40 MG tablet TAKE 1 TABLET(40 MG) BY MOUTH AT BEDTIME 90 tablet 0     traZODone (DESYREL) 50 MG tablet Take 1 tablet (50 mg) by mouth nightly as needed for sleep 30 tablet 0     budesonide-formoterol (SYMBICORT) 160-4.5 MCG/ACT Inhaler Inhale 2 puffs into the lungs 2 times daily 1 Inhaler 0     loratadine (CLARITIN) 10 MG tablet Take 1 tablet (10 mg) by mouth daily 30 tablet 0     fluticasone (FLONASE) 50 MCG/ACT spray Spray 2 sprays into both nostrils daily 16 g 6     albuterol (PROAIR HFA/PROVENTIL HFA/VENTOLIN HFA) 108 (90 BASE) MCG/ACT Inhaler Inhale 2 puffs into the lungs every 4 hours as needed for shortness of breath / dyspnea 1 Inhaler 0     amLODIPine (NORVASC) 5 MG tablet TAKE 1 TABLET(5 MG) BY MOUTH DAILY 90 tablet 3     aspirin 325 MG EC tablet Take 1 tablet (325 mg) by mouth daily 100 tablet 1     Allergies   Allergen Reactions     Penicillins      Throat swelled up       Recent Labs   Lab Test  02/28/17   1133  12/07/15   0747  08/19/15   1416  08/28/14   1030  08/06/14   1147  12/21/13   1723   02/15/12   1205   A1C   --    --    --    --    --   5.2   --    --    LDL  91  96   --    --   44  132*   --    --    HDL  76  80   --    --   79  87   --    --    TRIG  98  62   --    --   54  128   --    --    ALT   --    --   23   --   13   --    --   18   CR   --    --   0.70  0.66  0.76  0.66   < >  0.79   GFRESTIMATED   --    --   82  88  76  89   < >  73   GFRESTBLACK   --    --   >90   GFR Calc    >90   GFR Calc    >90   GFR Calc    >90   < >  88   POTASSIUM   --    --   3.8   --   4.5  3.1*   < >  3.8    < > = values in this interval not displayed.        ROS:  Constitutional, HEENT, cardiovascular, pulmonary, GI, , musculoskeletal, neuro, skin, endocrine and psych systems are negative, except as otherwise noted.        OBJECTIVE:  /68  Pulse 90  Temp 98.6  F (37  C) (Oral)  Wt 96 lb (43.5 kg)  SpO2 98%  BMI 19.72 kg/m2    EXAM:  GENERAL APPEARANCE: healthy, alert and no distress  RESP: lungs clear to auscultation - no rales, rhonchi or wheezes  CV: regular rates and rhythm, normal S1 S2, no S3 or S4 and no murmur, click or rub -  No lumbar pain with palpation today.   Normal gait.   No trochanteric pain with palpation.   Has pain in right lower lumbar muscles and buttocks muscles with palpation.         ASSESSMENT AND PLAN  Patient Instructions   ASSESSMENT AND PLAN  1. Pain in right buttock  Overall appears to be from muscle spasm.     Treat with increasing ibuprofen to one tablet three times daily for one week, rest and continue warm packs.     I recommend using cyclobenzaprine at night only to decrease spasm.  DO NOT USE WITH ALCOHOL.      Refills given for below.     - cyclobenzaprine (FLEXERIL) 10 MG tablet; Take 0.5-1 tablets (5-10 mg) by mouth nightly as needed for muscle spasms  Dispense: 30 tablet; Refill:  0  - ibuprofen (ADVIL/MOTRIN) 200 MG tablet; Take 1 tablet (200 mg) by mouth 3 times daily for 7 days  Dispense: 21 tablet; Refill: 0    2. Tobacco use disorder    - buPROPion (WELLBUTRIN SR) 150 MG 12 hr tablet; Take 1 tablet (150 mg) by mouth 2 times daily  Dispense: 60 tablet; Refill: 0    3. Hyperlipidemia LDL goal <70    - simvastatin (ZOCOR) 40 MG tablet; TAKE 1 TABLET(40 MG) BY MOUTH AT BEDTIME  Dispense: 90 tablet; Refill: 0    4. Insomnia, unspecified type    - traZODone (DESYREL) 50 MG tablet; Take 1 tablet (50 mg) by mouth nightly as needed for sleep  Dispense: 30 tablet; Refill: 0    5. Chronic bronchitis, unspecified chronic bronchitis type (H)    - budesonide-formoterol (SYMBICORT) 160-4.5 MCG/ACT Inhaler; Inhale 2 puffs into the lungs 2 times daily  Dispense: 1 Inhaler; Refill: 0  - albuterol (PROAIR HFA/PROVENTIL HFA/VENTOLIN HFA) 108 (90 BASE) MCG/ACT Inhaler; Inhale 2 puffs into the lungs every 4 hours as needed for shortness of breath / dyspnea  Dispense: 1 Inhaler; Refill: 0    6. Chronic nonseasonal allergic rhinitis due to pollen    - loratadine (CLARITIN) 10 MG tablet; Take 1 tablet (10 mg) by mouth daily  Dispense: 30 tablet; Refill: 0    7. Chronic allergic rhinitis, unspecified seasonality, unspecified trigger    - fluticasone (FLONASE) 50 MCG/ACT spray; Spray 2 sprays into both nostrils daily  Dispense: 16 g; Refill: 6    please put in sharlene crawford for a preventive physical with dr. purcell for 1:20 on december 12th    Joel Wegener,MD

## 2017-12-12 ENCOUNTER — OFFICE VISIT (OUTPATIENT)
Dept: FAMILY MEDICINE | Facility: CLINIC | Age: 72
End: 2017-12-12
Payer: MEDICARE

## 2017-12-12 VITALS
SYSTOLIC BLOOD PRESSURE: 130 MMHG | RESPIRATION RATE: 16 BRPM | BODY MASS INDEX: 19.47 KG/M2 | DIASTOLIC BLOOD PRESSURE: 77 MMHG | HEART RATE: 64 BPM | WEIGHT: 94.75 LBS | OXYGEN SATURATION: 99 %

## 2017-12-12 DIAGNOSIS — F10.21 ALCOHOL DEPENDENCE IN REMISSION (H): ICD-10-CM

## 2017-12-12 DIAGNOSIS — J44.9 CHRONIC OBSTRUCTIVE PULMONARY DISEASE, UNSPECIFIED COPD TYPE (H): ICD-10-CM

## 2017-12-12 DIAGNOSIS — E78.5 HYPERLIPIDEMIA LDL GOAL <70: ICD-10-CM

## 2017-12-12 DIAGNOSIS — F17.200 TOBACCO USE DISORDER: ICD-10-CM

## 2017-12-12 DIAGNOSIS — I10 HYPERTENSION GOAL BP (BLOOD PRESSURE) < 140/90: ICD-10-CM

## 2017-12-12 DIAGNOSIS — Z00.00 ROUTINE PHYSICAL EXAMINATION: Primary | ICD-10-CM

## 2017-12-12 DIAGNOSIS — I77.9 CAROTID DISEASE, BILATERAL (H): ICD-10-CM

## 2017-12-12 DIAGNOSIS — Z11.59 NEED FOR HEPATITIS C SCREENING TEST: ICD-10-CM

## 2017-12-12 DIAGNOSIS — E78.2 MIXED HYPERLIPIDEMIA: ICD-10-CM

## 2017-12-12 PROCEDURE — 80061 LIPID PANEL: CPT | Performed by: FAMILY MEDICINE

## 2017-12-12 PROCEDURE — G0472 HEP C SCREEN HIGH RISK/OTHER: HCPCS | Performed by: FAMILY MEDICINE

## 2017-12-12 PROCEDURE — G0439 PPPS, SUBSEQ VISIT: HCPCS | Performed by: FAMILY MEDICINE

## 2017-12-12 PROCEDURE — 80053 COMPREHEN METABOLIC PANEL: CPT | Performed by: FAMILY MEDICINE

## 2017-12-12 PROCEDURE — 36415 COLL VENOUS BLD VENIPUNCTURE: CPT | Performed by: FAMILY MEDICINE

## 2017-12-12 RX ORDER — AMLODIPINE BESYLATE 5 MG/1
TABLET ORAL
Qty: 90 TABLET | Refills: 3 | Status: SHIPPED | OUTPATIENT
Start: 2017-12-12 | End: 2018-11-16

## 2017-12-12 RX ORDER — SIMVASTATIN 40 MG
TABLET ORAL
Qty: 90 TABLET | Refills: 3 | Status: SHIPPED | OUTPATIENT
Start: 2017-12-12 | End: 2018-11-16

## 2017-12-12 NOTE — LETTER
December 13, 2017      Digna Montana  [address]   [place] [address]        Dear ,    We are writing to inform you of your test results.    Your hepatitis C screening test is negative.  Your kidney and liver function tests are normal.  Your cholesterol is normal    Resulted Orders   Lipid panel reflex to direct LDL Fasting   Result Value Ref Range    Cholesterol 145 <200 mg/dL    Triglycerides 73 <150 mg/dL      Comment:      Fasting specimen    HDL Cholesterol 84 >49 mg/dL    LDL Cholesterol Calculated 46 <100 mg/dL      Comment:      Desirable:       <100 mg/dl    Non HDL Cholesterol 61 <130 mg/dL   Comprehensive metabolic panel   Result Value Ref Range    Sodium 139 133 - 144 mmol/L    Potassium 4.8 3.4 - 5.3 mmol/L    Chloride 105 94 - 109 mmol/L    Carbon Dioxide 26 20 - 32 mmol/L    Anion Gap 8 3 - 14 mmol/L    Glucose 90 70 - 99 mg/dL      Comment:      Fasting specimen    Urea Nitrogen 11 7 - 30 mg/dL    Creatinine 0.71 0.52 - 1.04 mg/dL    GFR Estimate 81 >60 mL/min/1.7m2      Comment:      Non  GFR Calc    GFR Estimate If Black >90 >60 mL/min/1.7m2      Comment:       GFR Calc    Calcium 9.6 8.5 - 10.1 mg/dL    Bilirubin Total 0.5 0.2 - 1.3 mg/dL    Albumin 3.9 3.4 - 5.0 g/dL    Protein Total 7.5 6.8 - 8.8 g/dL    Alkaline Phosphatase 80 40 - 150 U/L    ALT 17 0 - 50 U/L    AST 14 0 - 45 U/L   Hepatitis C Screen Reflex to HCV RNA Quant and Genotype   Result Value Ref Range    Hepatitis C Antibody Nonreactive NR^Nonreactive      Comment:      Assay performance characteristics have not been established for newborns,   infants, and children         If you have any questions or concerns, please call the clinic at the number listed above.       Sincerely,        Simon Madden MD/nr

## 2017-12-12 NOTE — PROGRESS NOTES
SUBJECTIVE:   Digna Montana is a 72 year old female who presents for Preventive Visit.      Are you in the first 12 months of your Medicare coverage?  No    Physical   Annual:     Getting at least 3 servings of Calcium per day::  Yes    Bi-annual eye exam::  NO    Dental care twice a year::  NO    Sleep apnea or symptoms of sleep apnea::  None    Diet::  Regular (no restrictions)    Frequency of exercise::  1 day/week    Taking medications regularly::  Yes    Medication side effects::  None    Additional concerns today::  No      COGNITIVE SCREEN  1) Repeat 3 items (Banana, Sunrise, Chair)    2) Clock draw: Able to draw clock lable with numbers, but unable to draw hands of 10 past 11 or 11:10.   3) 3 item recall: Recalls NO objects   Results: 0 items recalled: PROBABLE COGNITIVE IMPAIRMENT, **INFORM PROVIDER**    Mini-CogTM Copyright WALTER Jameson. Licensed by the author for use in [facility]; reprinted with permission (ruben@[email or web address]). All rights reserved.          Reviewed and updated as needed this visit by clinical staffTobacco  Allergies  Med Hx  Surg Hx  Fam Hx  Soc Hx        Reviewed and updated as needed this visit by Provider        Social History   Substance Use Topics     Smoking status: Current Every Day Smoker     Types: Cigarettes     Smokeless tobacco: Current User      Comment: is using patch/quit plan     Alcohol use Yes      Comment: 2 drinks 3 times weekly       The patient does not drink >3 drinks per day nor >7 drinks per week.              Today's PHQ-2 Score:   PHQ-2 ( 1999 Pfizer) 12/12/2017   Q1: Little interest or pleasure in doing things 0   Q2: Feeling down, depressed or hopeless 0   PHQ-2 Score 0   Q1: Little interest or pleasure in doing things Not at all   Q2: Feeling down, depressed or hopeless Not at all   PHQ-2 Score 0       Do you feel safe in your environment - Yes    Do you have a Health Care Directive?: Yes: Patient states has Advance Directive and will bring in a  "copy to clinic.    Current providers sharing in care for this patient include:   Patient Care Team:  Simon Madden MD as PCP - General (Family Practice)  Deven Patel MD as MD (Family Practice)      Hearing impairment: No    Ability to successfully perform activities of daily living: Yes, no assistance needed     Fall risk:         Home safety:  none identified  click delete button to remove this line now    The following health maintenance items are reviewed in Epic and correct as of today:  Health Maintenance   Topic Date Due     SPIROMETRY ONETIME  1945     COPD ACTION PLAN Q1 YR  1945     HEPATITIS C SCREENING  11/19/1963     DEXA SCAN SCREENING (SYSTEM ASSIGNED)  11/19/2010     PNEUMOCOCCAL (1 of 2 - PCV13) 11/19/2010     MAMMO SCREEN Q2 YR (SYSTEM ASSIGNED)  08/26/2013     ADVANCE DIRECTIVE PLANNING Q5 YRS  12/11/2017     LIPID MONITORING Q1 YEAR  02/28/2018     PHQ-9 Q6 MONTHS  03/07/2018     DEPRESSION ACTION PLAN Q1 YR  03/16/2018     FALL RISK ASSESSMENT  06/19/2018     COLON CANCER SCREEN (SYSTEM ASSIGNED)  04/20/2021     TETANUS IMMUNIZATION (SYSTEM ASSIGNED)  09/07/2027     INFLUENZA VACCINE (SYSTEM ASSIGNED)  Addressed     Labs reviewed in EPIC         Review of Systems  Constitutional, HEENT, cardiovascular, pulmonary, GI, , musculoskeletal, neuro, skin, endocrine and psych systems are negative, except as otherwise noted.      OBJECTIVE:   /77 (BP Location: Right arm, Patient Position: Sitting, Cuff Size: Adult Regular)  Pulse 64  Resp 16  Wt 94 lb 12 oz (43 kg)  LMP  (LMP Unknown)  SpO2 99%  Breastfeeding? No  BMI 19.47 kg/m2 Estimated body mass index is 19.47 kg/(m^2) as calculated from the following:    Height as of 6/19/17: 4' 10.5\" (1.486 m).    Weight as of this encounter: 94 lb 12 oz (43 kg).  Physical Exam  GENERAL: healthy, alert and no distress  EYES: Eyes grossly normal to inspection, PERRL and conjunctivae and sclerae normal  HENT: ear canals " and TM's normal, nose and mouth without ulcers or lesions, upper and lower denture.   NECK: no adenopathy, no asymmetry, masses, or scars and thyroid normal to palpation  RESP: lungs clear to auscultation - no rales, rhonchi or wheezes  CV: regular rate and rhythm, normal S1 S2, no S3 or S4, bilateral carotid bruit  ABDOMEN: soft, nontender, no hepatosplenomegaly, no masses and bowel sounds normal  MS: no gross musculoskeletal defects noted, no edema  SKIN: no suspicious lesions or rashes  NEURO: Normal strength and tone, mentation intact and speech normal  PSYCH: mentation appears normal, affect normal/bright    ASSESSMENT / PLAN:   1. Routine physical examination  We have had a long conversations about preventative measures.  Her belief is if she is not suffering from any disease she would not like to have intervention for that.  So she declined mammogram, pneumococcal vaccine,spirometry and flu shot.  We had a conversation about why that all important and she understood but does not want any intervention    2. Tobacco use disorder  Ongoing.  She has been on Wellbutrin for years without any benefit.  I did not think there is any further need to use Wellbutrin for ongoing smoking.  I asked her if it is helping her with her depression or anxiety and she denies any concern of depression or anxiety and we agreed to stop Wellbutrin.    3. Chronic obstructive pulmonary disease, unspecified COPD type (H)  With ongoing smoking.  Was on Spiriva but was not using it consistently and she discontinued.  She is using as needed inhaler and feeling fine with her current symptoms    4. Mixed hyperlipidemia       5. Alcohol dependence in remission (H)  C admits she is not drinking alcohol more than 1 drink per week for years now    6. Hypertension goal BP (blood pressure) < 140/90  On amlodipine. Patient is tolerating current medication without any major side effects of concerns and current dose seems reasonable too.  Current  "medication regime is effective. Continue current treatment without any changes.   - Lipid panel reflex to direct LDL Fasting  - Comprehensive metabolic panel  - amLODIPine (NORVASC) 5 MG tablet; TAKE 1 TABLET(5 MG) BY MOUTH DAILY  Dispense: 90 tablet; Refill: 3    7. Carotid disease, bilateral (H)  History of carotid ultrasound with less than 50% stenosis in the past.  She does not want any further intervention    8. Need for hepatitis C screening test     - Hepatitis C Screen Reflex to HCV RNA Quant and Genotype    9. Hyperlipidemia LDL goal <70     - simvastatin (ZOCOR) 40 MG tablet; TAKE 1 TABLET(40 MG) BY MOUTH AT BEDTIME  Dispense: 90 tablet; Refill: 3    End of Life Planning:  Patient currently has an advanced directive: No.  I have verified the patient's ablity to prepare an advanced directive/make health care decisions.  Literature was provided to assist patient in preparing an advanced directive.    COUNSELING:  Reviewed preventive health counseling, as reflected in patient instructions  Special attention given to:       Regular exercise       Healthy diet/nutrition       Vision screening       Hearing screening       Dental care       Osteoporosis Prevention/Bone Health       Colon cancer screening       Hepatitis C screening        Estimated body mass index is 19.47 kg/(m^2) as calculated from the following:    Height as of 6/19/17: 4' 10.5\" (1.486 m).    Weight as of this encounter: 94 lb 12 oz (43 kg).  Weight management plan noted, stable and monitoring   reports that she has been smoking Cigarettes.  She uses smokeless tobacco.  Tobacco Cessation Action Plan: Information offered: Patient not interested at this time    Appropriate preventive services were discussed with this patient, including applicable screening as appropriate for cardiovascular disease, diabetes, osteopenia/osteoporosis, and glaucoma.  As appropriate for age/gender, discussed screening for colorectal cancer, prostate cancer, breast " cancer, and cervical cancer. Checklist reviewing preventive services available has been given to the patient.    Reviewed patients plan of care and provided an AVS. The Basic Care Plan (routine screening as documented in Health Maintenance) for Digna meets the Care Plan requirement. This Care Plan has been established and reviewed with the Patient.    Counseling Resources:  ATP IV Guidelines  Pooled Cohorts Equation Calculator  Breast Cancer Risk Calculator  FRAX Risk Assessment  ICSI Preventive Guidelines  Dietary Guidelines for Americans, 2010  USDA's MyPlate  ASA Prophylaxis  Lung CA Screening    Simon Madden MD, MD  [facility]

## 2017-12-12 NOTE — MR AVS SNAPSHOT
After Visit Summary   12/12/2017    Digna Montana    MRN: [id number]           Patient Information     Date Of Birth          1945        Visit Information        Provider Department      12/12/2017 1:20 PM Simon Madden MD [facility]        Today's Diagnoses     Routine physical examination    -  1    Tobacco use disorder        Chronic obstructive pulmonary disease, unspecified COPD type (H)        Mixed hyperlipidemia        Alcohol dependence in remission (H)        Hypertension goal BP (blood pressure) < 140/90        Carotid disease, bilateral (H)        Need for hepatitis C screening test        Hyperlipidemia LDL goal <70          Care Instructions      Preventive Health Recommendations    Female Ages 65 +    Yearly exam:     See your health care provider every year in order to  o Review health changes.   o Discuss preventive care.    o Review your medicines if your doctor has prescribed any.      You no longer need a yearly Pap test unless you've had an abnormal Pap test in the past 10 years. If you have vaginal symptoms, such as bleeding or discharge, be sure to talk with your provider about a Pap test.      Every 1 to 2 years, have a mammogram.  If you are over 69, talk with your health care provider about whether or not you want to continue having screening mammograms.      Every 10 years, have a colonoscopy. Or, have a yearly FIT test (stool test). These exams will check for colon cancer.       Have a cholesterol test every 5 years, or more often if your doctor advises it.       Have a diabetes test (fasting glucose) every three years. If you are at risk for diabetes, you should have this test more often.       At age 65, have a bone density scan (DEXA) to check for osteoporosis (brittle bone disease).    Shots:    Get a flu shot each year.    Get a tetanus shot every 10 years.    Talk to your doctor about your pneumonia vaccines. There are now two you  "should receive - Pneumovax (PPSV 23) and Prevnar (PCV 13).    Talk to your doctor about the shingles vaccine.    Talk to your doctor about the hepatitis B vaccine.    Nutrition:     Eat at least 5 servings of fruits and vegetables each day.      Eat whole-grain bread, whole-wheat pasta and brown rice instead of white grains and rice.      Talk to your provider about Calcium and Vitamin D.     Lifestyle    Exercise at least 150 minutes a week (30 minutes a day, 5 days a week). This will help you control your weight and prevent disease.      Limit alcohol to one drink per day.      No smoking.       Wear sunscreen to prevent skin cancer.       See your dentist twice a year for an exam and cleaning.      See your eye doctor every 1 to 2 years to screen for conditions such as glaucoma, macular degeneration and cataracts.          Follow-ups after your visit        Who to contact     If you have questions or need follow up information about today's clinic visit or your schedule please contact [facility] directly at [phone].  Normal or non-critical lab and imaging results will be communicated to you by [employer], letter or phone within 4 business days after the clinic has received the results. If you do not hear from us within 7 days, please contact the clinic through "[employer]t or phone. If you have a critical or abnormal lab result, we will notify you by phone as soon as possible.  Submit refill requests through [employer] or call your pharmacy and they will forward the refill request to us. Please allow 3 business days for your refill to be completed.          Additional Information About Your Visit        [employer] Information     [employer] lets you send messages to your doctor, view your test results, renew your prescriptions, schedule appointments and more. To sign up, go to [email or web address] . Click on \"Log in\" on the left side of the screen, which will take you to the Welcome page. Then click on " "\"Sign up Now\" on the right side of the page.     You will be asked to enter the access code listed below, as well as some personal information. Please follow the directions to create your username and password.     Your access code is: [email or web address]  Expires: 2018  1:01 PM     Your access code will  in 90 days. If you need help or a new code, please call your [place] clinic or [phone].        Care EveryWhere ID     This is your Care EveryWhere ID. This could be used by other organizations to access your [place] medical records  [id number]        Your Vitals Were     Pulse Respirations Last Period Pulse Oximetry Breastfeeding? BMI (Body Mass Index)    64 16 (LMP Unknown) 99% No 19.47 kg/m2       Blood Pressure from Last 3 Encounters:   17 130/77   17 120/68   09/15/17 111/55    Weight from Last 3 Encounters:   17 94 lb 12 oz (43 kg)   17 96 lb (43.5 kg)   17 92 lb (41.7 kg)              We Performed the Following     Comprehensive metabolic panel     Hepatitis C Screen Reflex to HCV RNA Quant and Genotype     Lipid panel reflex to direct LDL Fasting          Today's Medication Changes          These changes are accurate as of: 17  3:00 PM.  If you have any questions, ask your nurse or doctor.               These medicines have changed or have updated prescriptions.        Dose/Directions    amLODIPine 5 MG tablet   Commonly known as:  NORVASC   This may have changed:  See the new instructions.   Used for:  Hypertension goal BP (blood pressure) < 140/90   Changed by:  Simon Madden MD        TAKE 1 TABLET(5 MG) BY MOUTH DAILY   Quantity:  90 tablet   Refills:  3         Stop taking these medicines if you haven't already. Please contact your care team if you have questions.     buPROPion 150 MG 12 hr tablet   Commonly known as:  WELLBUTRIN SR   Stopped by:  Simon Madden MD                Where to get your medicines      These medications " were sent to [facility] [id number] [address] [address]     Phone:  [phone]     amLODIPine 5 MG tablet    simvastatin 40 MG tablet                Primary Care Provider Office Phone # Fax #    Simon Jesus Madden [phone] [address] [facility] [address]        Equal Access to Services     GWENDOLYN CONSTANTINO : Hadii aad ku hadasho Soomaali, waaxda luqadaha, qaybta kaalmada adeegyada, waxay idiin hayaan adeeg kharaalexa la'tano . So [facility] [phone].    ATENCIÓN: Si habla español, tiene a peter disposición servicios gratuitos de asistencia lingüística. [facility] [phone].    We comply with applicable federal civil rights laws and [place] laws. We do not discriminate on the basis of race, color, national origin, age, disability, sex, sexual orientation, or gender identity.            Thank you!     Thank you for choosing [facility]  for your care. Our goal is always to provide you with excellent care. Hearing back from our patients is one way we can continue to improve our services. Please take a few minutes to complete the written survey that you may receive in the mail after your visit with us. Thank you!             Your Updated Medication List - Protect others around you: Learn how to safely use, store and throw away your medicines at www.disposemymeds.org.          This list is accurate as of: 12/12/17  3:00 PM.  Always use your most recent med list.                   Brand Name Dispense Instructions for use Diagnosis    albuterol 108 (90 BASE) MCG/ACT Inhaler    PROAIR HFA/PROVENTIL HFA/VENTOLIN HFA    1 Inhaler    Inhale 2 puffs into the lungs every 4 hours as needed for shortness of breath / dyspnea    Chronic bronchitis, unspecified chronic bronchitis type (H)       amLODIPine 5 MG tablet    NORVASC    90 tablet    TAKE 1 TABLET(5 MG) BY MOUTH DAILY    Hypertension goal BP (blood  pressure) < 140/90       aspirin 325 MG EC tablet     100 tablet    Take 1 tablet (325 mg) by mouth daily    Atherosclerotic occlusive disease       budesonide-formoterol 160-4.5 MCG/ACT Inhaler    SYMBICORT    1 Inhaler    Inhale 2 puffs into the lungs 2 times daily    Chronic bronchitis, unspecified chronic bronchitis type (H)       cyclobenzaprine 10 MG tablet    FLEXERIL    30 tablet    Take 0.5-1 tablets (5-10 mg) by mouth nightly as needed for muscle spasms    Pain in right buttock       fluticasone 50 MCG/ACT spray    FLONASE    16 g    Spray 2 sprays into both nostrils daily    Chronic allergic rhinitis, unspecified seasonality, unspecified trigger       loratadine 10 MG tablet    CLARITIN    30 tablet    Take 1 tablet (10 mg) by mouth daily    Chronic nonseasonal allergic rhinitis due to pollen       simvastatin 40 MG tablet    ZOCOR    90 tablet    TAKE 1 TABLET(40 MG) BY MOUTH AT BEDTIME    Hyperlipidemia LDL goal <70       traZODone 50 MG tablet    DESYREL    30 tablet    Take 1 tablet (50 mg) by mouth nightly as needed for sleep    Insomnia, unspecified type

## 2017-12-13 LAB
ALBUMIN SERPL-MCNC: 3.9 G/DL (ref 3.4–5)
ALP SERPL-CCNC: 80 U/L (ref 40–150)
ALT SERPL W P-5'-P-CCNC: 17 U/L (ref 0–50)
ANION GAP SERPL CALCULATED.3IONS-SCNC: 8 MMOL/L (ref 3–14)
AST SERPL W P-5'-P-CCNC: 14 U/L (ref 0–45)
BILIRUB SERPL-MCNC: 0.5 MG/DL (ref 0.2–1.3)
BUN SERPL-MCNC: 11 MG/DL (ref 7–30)
CALCIUM SERPL-MCNC: 9.6 MG/DL (ref 8.5–10.1)
CHLORIDE SERPL-SCNC: 105 MMOL/L (ref 94–109)
CHOLEST SERPL-MCNC: 145 MG/DL
CO2 SERPL-SCNC: 26 MMOL/L (ref 20–32)
CREAT SERPL-MCNC: 0.71 MG/DL (ref 0.52–1.04)
GFR SERPL CREATININE-BSD FRML MDRD: 81 ML/MIN/1.7M2
GLUCOSE SERPL-MCNC: 90 MG/DL (ref 70–99)
HCV AB SERPL QL IA: NONREACTIVE
HDLC SERPL-MCNC: 84 MG/DL
LDLC SERPL CALC-MCNC: 46 MG/DL
NONHDLC SERPL-MCNC: 61 MG/DL
POTASSIUM SERPL-SCNC: 4.8 MMOL/L (ref 3.4–5.3)
PROT SERPL-MCNC: 7.5 G/DL (ref 6.8–8.8)
SODIUM SERPL-SCNC: 139 MMOL/L (ref 133–144)
TRIGL SERPL-MCNC: 73 MG/DL

## 2018-06-09 DIAGNOSIS — G47.00 INSOMNIA, UNSPECIFIED TYPE: ICD-10-CM

## 2018-06-11 NOTE — TELEPHONE ENCOUNTER
"Requested Prescriptions   Pending Prescriptions Disp Refills     traZODone (DESYREL) 50 MG tablet [Pharmacy Med Name: TRAZODONE 50MG TABLETS]  Last Written Prescription Date:  11/14/2017  Last Fill Quantity: 30 TABLET,  # refills: 0   Last Office Visit: 12/12/2017   Future Office Visit:      270 tablet 0     Sig: TAKE 1 TO 3 TABLETS(50  MG) BY MOUTH EVERY NIGHT AS NEEDED FOR SLEEP    Serotonin Modulators Passed    6/9/2018  3:45 PM       Passed - Recent (12 mo) or future (30 days) visit within the authorizing provider's specialty    Patient had office visit in the last 12 months or has a visit in the next 30 days with authorizing provider or within the authorizing provider's specialty.  See \"Patient Info\" tab in inbasket, or \"Choose Columns\" in Meds & Orders section of the refill encounter.           Passed - Patient is age 18 or older       Passed - No active pregnancy on record       Passed - No positive pregnancy test in past 12 months          "

## 2018-06-12 RX ORDER — TRAZODONE HYDROCHLORIDE 50 MG/1
TABLET, FILM COATED ORAL
Qty: 270 TABLET | Refills: 0 | Status: SHIPPED | OUTPATIENT
Start: 2018-06-12 | End: 2018-11-16

## 2018-06-12 NOTE — TELEPHONE ENCOUNTER
Routing refill request to provider for review/approval because:  A break in medication    Som Lee, RN, BSN

## 2018-07-07 ENCOUNTER — TELEPHONE (OUTPATIENT)
Dept: FAMILY MEDICINE | Facility: CLINIC | Age: 73
End: 2018-07-07

## 2018-07-07 NOTE — TELEPHONE ENCOUNTER
7/7/2018    Call Regarding [facility] Mammogram    Attempt 3    Message on voicemail    Patient is also due for:     COMMENTS: Patient needs to look to see if she could get a ride to make an appointment. And will give us a call back if she can schedule    Outreach   SV

## 2018-09-11 ENCOUNTER — PATIENT OUTREACH (OUTPATIENT)
Dept: GERIATRIC MEDICINE | Facility: CLINIC | Age: 73
End: 2018-09-11

## 2018-09-11 NOTE — LETTER
September 11, 2018    DIGNA VALLEJO  [address]   [facility] [address]    Dear Digna,    Welcome to [facility] ([facility] health program. My name is Marlin Gilbert RN, MA, and I am your MSC+ .     I will call you soon to see how you are doing and determine what needs you may have. My job is to help connect you to services, complete an assessment, and develop a care plan with you. There is no charge to you for the case management and assessment services. Our goal is to keep you as healthy and independent as possible.     [facility] includes the benefits you may receive from Medical Assistance.    Soon, you will receive a new [facility] member identification (ID) card from [facility]. When you receive it, please use this card along with your [place] Health Care Programs card and Prescription Drug Coverage Program card. You must show all of these cards whenever you get health services. If you have Medicare, you will need to show your Medicare card when you get health services.    If you have questions, please feel free to call me at [phone]. If you reach my voice mail, please leave a message and your phone number. If you are hearing impaired, please call the [place] Relay at 563 or [phone] (koqrno-ct-cmazqx relay service).    Sincerely,      Marlin Gilbert RN, MA    [employer]  [email or web address]    MSC+ [id number] DHS Approved ([id number])    (12/16)

## 2018-09-11 NOTE — PROGRESS NOTES
[facility] Care Coordination Contact  On [facility] enrollment.  WL sent to member.   Elyssa Beach  Case Management Specialist  [facility]  [phone]

## 2018-09-12 ENCOUNTER — PATIENT OUTREACH (OUTPATIENT)
Dept: GERIATRIC MEDICINE | Facility: CLINIC | Age: 73
End: 2018-09-12

## 2018-09-13 NOTE — PROGRESS NOTES
[facility] Care Coordination Contact  Call placed to member for introduction and to schedule a home visit appointment to complete the annual HRA.  A home visit has been scheduled for 09/20/18.    Marlin Gilbert RN  [facility] Care Coordinator  [phone]

## 2018-09-20 ENCOUNTER — PATIENT OUTREACH (OUTPATIENT)
Dept: GERIATRIC MEDICINE | Facility: CLINIC | Age: 73
End: 2018-09-20

## 2018-09-21 ASSESSMENT — ACTIVITIES OF DAILY LIVING (ADL): DEPENDENT_IADLS:: SHOPPING;TRANSPORTATION

## 2018-09-21 NOTE — PROGRESS NOTES
[facility] Care Coordination Contact    [facility] Initial Assessment     Home visit for Initial Health Risk Assessment with Digna Montana completed on September 20, 2018    Type of residence:: Apartment - handicap accessible  Current living arrangement:: I live alone          Current Care Plan  Member currently receiving the following home care services:   None  Member currently receiving the following community resources: None      Medication Review  Medication reconciliation completed in Epic: Yes  Medication set-up & administration: Independent-does not set up.  Self-administers medications.  Medication understanding concerns (by member, family or CC): No  Medication adherence concerns (by member, family or CC): Client is not taking her medications which were on her list. Flagged for PCP to review.    Mental/Behavioral Health   Depression Screening: See PHQ assessment flowsheet.   Mental health DX:: No      No current MH services-will place referral for N/A    Falls Assessment:   Fallen 2 or more times in the past year?: No   Any fall with injury in the past year?: No    ADL/IADL Dependencies:   Dependent ADLs:: Independent, Ambulation-no assistive device  Dependent IADLs:: Shopping, Transportation    [facility] Health Plan sponsored benefits: Shared information re: Silver Sneakers/gym memberships, ASA, Calcium +D.    PCA Assessment completed at visit: Not applicable     Elderly Waiver Eligibility: Yes-will open to EW    Care Plan & Recommendations: Client is reluctant to have any home services. When talking with the  in client's apartment building. She felt client could use some more support for socialization and assistance with paperwork and organizing. She will talk with client further about getting more help. CC filled out paperwork to open client to EW services in order to cover more help in the home. Forms faxed to [facility].    See [facility] for detailed assessment  information.    Follow-Up Plan: Member informed of future contact, plan to f/u with member with a 6 month telephone assessment.  Contact information shared with member and family, encouraged member to call with any questions or concerns at any time.    [place] care continuum providers: Please refer to Health Care Home on the Epic Problem List to view this patient's [facility] Care Plan Summary.  Marlin Gilbert RN  [facility] Care Coordinator  [phone]

## 2018-10-12 ENCOUNTER — PATIENT OUTREACH (OUTPATIENT)
Dept: GERIATRIC MEDICINE | Facility: CLINIC | Age: 73
End: 2018-10-12

## 2018-10-12 NOTE — LETTER
October 12, 2018    DIGNA VALLEJO  [address]   [facility] [address]    Dear Digna,    Welcome to [facility] [place] [facility] Options ([facility]) (O hospitals) health program. My name is Marlin Gilbert RN, MA, and I am your [facility] .     I will call you soon to see how you are doing and determine what needs you may have. My job is to help connect you to services, complete an assessment, and develop a care plan with you. There is no charge to you for the case management and assessment services. Our goal is to keep you as healthy and independent as possible.     [facility] combines the benefits you may already receive from Medical Assistance, Medicare and the Prescription Drug Coverage Program.    Soon you will receive a new [facility] member identification (ID) card from [facility]. You must show this card whenever you get health services.    If you have questions, please feel free to call me at [phone]. If you reach my voice mail, please leave a message and your phone number. If you are hearing impaired, please call the [place] Relay at 620 or [phone] (ectxfl-rv-hlwqqn relay service).    Sincerely,      Marlin Gilbert RN, MA    [employer]  [email or web address]    [[facility] members only:  [facility] is a health plan that contracts with both Medicare and the [place] Medical Assistance (Medicaid) program to provide benefits of both programs to enrollees. Enrollment in [facility] depends on contract renewal.]    [facility] [id number] DHS Approved ([id number])    (12/16)

## 2018-10-12 NOTE — PROGRESS NOTES
[facility] Care Coordination Contact  Member on [facility] enrollment with insurance change.  WL sent.   Elyssa Beach  Case Management Specialist  [facility]  [phone]

## 2018-10-18 ENCOUNTER — PATIENT OUTREACH (OUTPATIENT)
Dept: GERIATRIC MEDICINE | Facility: CLINIC | Age: 73
End: 2018-10-18

## 2018-10-18 NOTE — LETTER
October 18, 2018    DIGNA VALLEJO  [address]   [facility] [address]     Dear Digna,    At [facility], we re dedicated to improving the health and well-being of our members.  Enclosed you will find the Comprehensive Care Plan that was developed with you on 9/20/18. Please review the Care Plan carefully.    As a reminder, some of the things we discussed at your visit include:    Ways to improve or maintain your physical health such as walking 20 minutes a day.     Ways to reduce the risk of falls.     Health care needs you may have.     Don t forget to contact your care coordinator if you:    Have been hospitalized or plan to be hospitalized.     Have experienced a fall.      Have experienced a change in physical health, which may include bladder control and pain issues.      Are experiencing emotional problems.     If you do not agree with your Care Plan, have questions about it, or have experienced a change in your needs, please contact me, your care coordinator, at [phone]. If you are hearing impaired, please call the [place] Relay at 778 or [phone] (xjjwiq-ra-yeupij relay service).    Sincerely,      Marlin Gilbert RN, [facility]     MSC+ [id number] [id number]                                        (12/16)

## 2018-10-18 NOTE — PROGRESS NOTES
[facility] Care Coordination Contact    Received after visit chart from care coordinator.  Completed following tasks: Mailed copy of care plan to client  Chart was returned to FANG.   Elyssa Beach  Case Management Specialist  [facility]  [phone]

## 2018-10-25 ENCOUNTER — PATIENT OUTREACH (OUTPATIENT)
Dept: GERIATRIC MEDICINE | Facility: CLINIC | Age: 73
End: 2018-10-25

## 2018-10-31 NOTE — PROGRESS NOTES
[facility] Care Coordination Contact    [facility] Health Plan or Product Change    CC received notification that member's health plan or health plan product changed from are [employer] to [employer] [facility] effective 10/01/18.  CC contacted member and discussed change and face-to-face visit was offered. Member declined need for home visit. CC reviewed previous Health Risk Assessment/LTCC and POC with member and see POC and/or Health Risk Assessment/LTCC for changes.  Transitional HRA completed. Care Plan Summary updated and reflects current services.  Required referral authorization information communicated to CMS: Not applicable  MMIS entry completed by: Case Management Specialist  Writer reviewed the following with member:  ER visits: No  Hospitalizations: No  TCU stays: No  Significant health status changes: No  Falls/Injuries: No  ADL/IADL changes: No  Changes in services: Yes: Client is interested in ICLS services. Referral sent to agency.    Follow-Up Plan: Member informed of future contact, plan to f/u with member with at next regularly scheduled contact.  Contact information shared with member and family, encouraged member to call with any questions or concerns.  Marlin Gilbert RN  [facility] Care Coordinator  [phone]

## 2018-11-09 ENCOUNTER — PATIENT OUTREACH (OUTPATIENT)
Dept: GERIATRIC MEDICINE | Facility: CLINIC | Age: 73
End: 2018-11-09

## 2018-11-09 NOTE — PROGRESS NOTES
[facility] Care Coordination Contact    CC was notified by Patricia from [employer] that she met with client on 11/08. Client is reluctant to start ICLS services at this time and would like to think about it. CC will call Patricia back if client decides to start services.  Marlin Gilbert RN  [facility] Care Coordinator  [phone]

## 2018-11-16 ENCOUNTER — OFFICE VISIT (OUTPATIENT)
Dept: FAMILY MEDICINE | Facility: CLINIC | Age: 73
End: 2018-11-16
Payer: COMMERCIAL

## 2018-11-16 VITALS
RESPIRATION RATE: 16 BRPM | SYSTOLIC BLOOD PRESSURE: 113 MMHG | OXYGEN SATURATION: 96 % | HEIGHT: 58 IN | HEART RATE: 78 BPM | WEIGHT: 94.25 LBS | TEMPERATURE: 96.6 F | DIASTOLIC BLOOD PRESSURE: 72 MMHG | BODY MASS INDEX: 19.78 KG/M2

## 2018-11-16 DIAGNOSIS — F17.200 TOBACCO USE DISORDER: ICD-10-CM

## 2018-11-16 DIAGNOSIS — L60.8 NAIL DEFORMITY: ICD-10-CM

## 2018-11-16 DIAGNOSIS — Z86.73 HISTORY OF RIGHT MCA STROKE: ICD-10-CM

## 2018-11-16 DIAGNOSIS — E78.2 MIXED HYPERLIPIDEMIA: ICD-10-CM

## 2018-11-16 DIAGNOSIS — I10 HYPERTENSION GOAL BP (BLOOD PRESSURE) < 140/90: ICD-10-CM

## 2018-11-16 DIAGNOSIS — Z23 NEED FOR PROPHYLACTIC VACCINATION AND INOCULATION AGAINST INFLUENZA: ICD-10-CM

## 2018-11-16 DIAGNOSIS — J44.9 CHRONIC OBSTRUCTIVE PULMONARY DISEASE, UNSPECIFIED COPD TYPE (H): Primary | ICD-10-CM

## 2018-11-16 DIAGNOSIS — G47.00 INSOMNIA, UNSPECIFIED TYPE: Primary | ICD-10-CM

## 2018-11-16 PROCEDURE — 90662 IIV NO PRSV INCREASED AG IM: CPT | Performed by: FAMILY MEDICINE

## 2018-11-16 PROCEDURE — G0008 ADMIN INFLUENZA VIRUS VAC: HCPCS | Performed by: FAMILY MEDICINE

## 2018-11-16 PROCEDURE — 99214 OFFICE O/P EST MOD 30 MIN: CPT | Mod: 25 | Performed by: FAMILY MEDICINE

## 2018-11-16 ASSESSMENT — ANXIETY QUESTIONNAIRES
IF YOU CHECKED OFF ANY PROBLEMS ON THIS QUESTIONNAIRE, HOW DIFFICULT HAVE THESE PROBLEMS MADE IT FOR YOU TO DO YOUR WORK, TAKE CARE OF THINGS AT HOME, OR GET ALONG WITH OTHER PEOPLE: NOT DIFFICULT AT ALL
GAD7 TOTAL SCORE: 0
7. FEELING AFRAID AS IF SOMETHING AWFUL MIGHT HAPPEN: NOT AT ALL
2. NOT BEING ABLE TO STOP OR CONTROL WORRYING: NOT AT ALL
3. WORRYING TOO MUCH ABOUT DIFFERENT THINGS: NOT AT ALL
5. BEING SO RESTLESS THAT IT IS HARD TO SIT STILL: NOT AT ALL
6. BECOMING EASILY ANNOYED OR IRRITABLE: NOT AT ALL
1. FEELING NERVOUS, ANXIOUS, OR ON EDGE: NOT AT ALL

## 2018-11-16 ASSESSMENT — PATIENT HEALTH QUESTIONNAIRE - PHQ9
SUM OF ALL RESPONSES TO PHQ QUESTIONS 1-9: 0
5. POOR APPETITE OR OVEREATING: NOT AT ALL

## 2018-11-16 NOTE — MR AVS SNAPSHOT
"              After Visit Summary   11/16/2018    Digna Montana    MRN: [id number]           Patient Information     Date Of Birth          1945        Visit Information        Provider Department      11/16/2018 10:00 AM Simon Madden MD [facility]        Today's Diagnoses     Chronic obstructive pulmonary disease, unspecified COPD type (H)    -  1    Tobacco use disorder        Hypertension goal BP (blood pressure) < 140/90        History of right MCA stroke        Need for prophylactic vaccination and inoculation against influenza           Follow-ups after your visit        Who to contact     If you have questions or need follow up information about today's clinic visit or your schedule please contact [facility] directly at [phone].  Normal or non-critical lab and imaging results will be communicated to you by MyChart, letter or phone within 4 business days after the clinic has received the results. If you do not hear from us within 7 days, please contact the clinic through MyChart or phone. If you have a critical or abnormal lab result, we will notify you by phone as soon as possible.  Submit refill requests through [employer] or call your pharmacy and they will forward the refill request to us. Please allow 3 business days for your refill to be completed.          Additional Information About Your Visit        Care EveryWhere ID     This is your Care EveryWhere ID. This could be used by other organizations to access your [facility] medical records  [id number]        Your Vitals Were     Pulse Temperature Respirations Height Last Period Pulse Oximetry    78 96.6  F (35.9  C) (Tympanic) 16 4' 9.75\" (1.467 m) (LMP Unknown) 96%    BMI (Body Mass Index)                   19.87 kg/m2            Blood Pressure from Last 3 Encounters:   11/16/18 113/72   12/12/17 130/77   11/14/17 120/68    Weight from Last 3 Encounters:   11/16/18 94 lb 4 oz (42.8 kg)   12/12/17 94 " lb 12 oz (43 kg)   11/14/17 96 lb (43.5 kg)              We Performed the Following     DEPRESSION ACTION PLAN (DAP)     FLU VACCINE, INCREASED ANTIGEN, PRESV FREE, AGE 65+ [20862]     Vaccine Administration, Initial [41691]          Today's Medication Changes          These changes are accurate as of 11/16/18  3:32 PM.  If you have any questions, ask your nurse or doctor.               Stop taking these medicines if you haven't already. Please contact your care team if you have questions.     amLODIPine 5 MG tablet   Commonly known as:  NORVASC   Stopped by:  Simon Madden MD           aspirin 325 MG EC tablet   Stopped by:  Simon Madden MD           budesonide-formoterol 160-4.5 MCG/ACT Inhaler   Commonly known as:  SYMBICORT   Stopped by:  Simon Madden MD           buPROPion 150 MG 12 hr tablet   Commonly known as:  ZYBAN   Stopped by:  Simon Madden MD           cyclobenzaprine 10 MG tablet   Commonly known as:  FLEXERIL   Stopped by:  Simon Madden MD           fluticasone 50 MCG/ACT spray   Commonly known as:  FLONASE   Stopped by:  Simon Madden MD           loratadine 10 MG tablet   Commonly known as:  CLARITIN   Stopped by:  Simon Madden MD           simvastatin 40 MG tablet   Commonly known as:  ZOCOR   Stopped by:  Simon Madden MD           traZODone 50 MG tablet   Commonly known as:  DESYREL   Stopped by:  Simon Madden MD                    Primary Care Provider Office Phone # Fax #    Simon Madden [phone] [address] [place] [id number]        Equal Access to Services     [facility] : Hadii favian English, wabenjaminda luqadaha, qaybta kaalmada adeegyada, shilo barkley. So [facility] [phone].    ATENCIÓN: Si habla español, tiene a peter disposición servicios gratuitos de asistencia lingüística. Llame al  [phone].    We comply with applicable federal civil rights laws and [place] laws. We do not discriminate on the basis of race, color, national origin, age, disability, sex, sexual orientation, or gender identity.            Thank you!     Thank you for choosing [facility]  for your care. Our goal is always to provide you with excellent care. Hearing back from our patients is one way we can continue to improve our services. Please take a few minutes to complete the written survey that you may receive in the mail after your visit with us. Thank you!             Your Updated Medication List - Protect others around you: Learn how to safely use, store and throw away your medicines at www.disposemymeds.org.          This list is accurate as of 11/16/18  3:32 PM.  Always use your most recent med list.                   Brand Name Dispense Instructions for use Diagnosis    albuterol 108 (90 Base) MCG/ACT inhaler    PROAIR HFA/PROVENTIL HFA/VENTOLIN HFA    1 Inhaler    Inhale 2 puffs into the lungs every 4 hours as needed for shortness of breath / dyspnea    Chronic bronchitis, unspecified chronic bronchitis type (H)

## 2018-11-16 NOTE — LETTER
My Depression Action Plan  Name: Digna Montana   Date of Birth 1945  Date: 11/16/2018    My doctor: Simon Madden   My clinic: [address] [address] [phone]          GREEN    ZONE   Good Control    What it looks like:     Things are going generally well. You have normal up s and down s. You may even feel depressed from time to time, but bad moods usually last less than a day.   What you need to do:  1. Continue to care for yourself (see self care plan)  2. Check your depression survival kit and update it as needed  3. Follow your physician s recommendations including any medication.  4. Do not stop taking medication unless you consult with your physician first.           YELLOW         ZONE Getting Worse    What it looks like:     Depression is starting to interfere with your life.     It may be hard to get out of bed; you may be starting to isolate yourself from others.    Symptoms of depression are starting to last most all day and this has happened for several days.     You may have suicidal thoughts but they are not constant.   What you need to do:     1. Call your care team, your response to treatment will improve if you keep your care team informed of your progress. Yellow periods are signs an adjustment may need to be made.     2. Continue your self-care, even if you have to fake it!    3. Talk to someone in your support network    4. Open up your depression survival kit           RED    ZONE Medical Alert - Get Help    What it looks like:     Depression is seriously interfering with your life.     You may experience these or other symptoms: You can t get out of bed most days, can t work or engage in other necessary activities, you have trouble taking care of basic hygiene, or basic responsibilities, thoughts of suicide or death that will not go away, self-injurious behavior.     What you need to do:  1. Call your care  team and request a same-day appointment. If they are not available (weekends or after hours) call your local crisis line, emergency room or 911.            Depression Self Care Plan / Survival Kit    Self-Care for Depression  Here s the deal. Your body and mind are really not as separate as most people think.  What you do and think affects how you feel and how you feel influences what you do and think. This means if you do things that people who feel good do, it will help you feel better.  Sometimes this is all it takes.  There is also a place for medication and therapy depending on how severe your depression is, so be sure to consult with your medical provider and/ or Behavioral Health Consultant if your symptoms are worsening or not improving.     In order to better manage my stress, I will:    Exercise  Get some form of exercise, every day. This will help reduce pain and release endorphins, the  feel good  chemicals in your brain. This is almost as good as taking antidepressants!  This is not the same as joining a gym and then never going! (they count on that by the way ) It can be as simple as just going for a walk or doing some gardening, anything that will get you moving.      Hygiene   Maintain good hygiene (Get out of bed in the morning, Make your bed, Brush your teeth, Take a shower, and Get dressed like you were going to work, even if you are unemployed).  If your clothes don't fit try to get ones that do.    Diet  I will strive to eat foods that are good for me, drink plenty of water, and avoid excessive sugar, caffeine, alcohol, and other mood-altering substances.  Some foods that are helpful in depression are: complex carbohydrates, B vitamins, flaxseed, fish or fish oil, fresh fruits and vegetables.    Psychotherapy  I agree to participate in Individual Therapy (if recommended).    Medication  If prescribed medications, I agree to take them.  Missing doses can result in serious side effects.  I  understand that drinking alcohol, or other illicit drug use, may cause potential side effects.  I will not stop my medication abruptly without first discussing it with my provider.    Staying Connected With Others  I will stay in touch with my friends, family members, and my primary care provider/team.    Use your imagination  Be creative.  We all have a creative side; it doesn t matter if it s oil painting, sand castles, or mud pies! This will also kick up the endorphins.    Witness Beauty  (AKA stop and smell the roses) Take a look outside, even in mid-winter. Notice colors, textures. Watch the squirrels and birds.     Service to others  Be of service to others.  There is always someone else in need.  By helping others we can  get out of ourselves  and remember the really important things.  This also provides opportunities for practicing all the other parts of the program.    Humor  Laugh and be silly!  Adjust your TV habits for less news and crime-drama and more comedy.    Control your stress  Try breathing deep, massage therapy, biofeedback, and meditation. Find time to relax each day.     My support system    Clinic Contact:  Phone number:    Contact 1:  Phone number:    Contact 2:  Phone number:    Yazdanism/:  Phone number:    Therapist:  Phone number:    Local crisis center:    Phone number:    Other community support:  Phone number:

## 2018-11-16 NOTE — TELEPHONE ENCOUNTER
Just call and clarify with patient - today she stated she wishes not to take any medications in office visit despite risks.  Did she change her mind? If yes - ok to sign rx.

## 2018-11-16 NOTE — TELEPHONE ENCOUNTER
PT was seen by Dr. Madden today.  She forgot to ask for the following refills-    trazodone 150 mg take 1-3 tabs at bedtime.  Simvastatin 40 mg. Take 1 tab at bedtime.  Pended.    They are not listed on med list.  Routing to pcp.      Ok to leave a detailed message.  DOMINICK Pate

## 2018-11-16 NOTE — PROGRESS NOTES
SUBJECTIVE:   Digna Montana is a 72 year old female who presents to clinic today for the following health issues:    Toe pain      Duration: 2 weeks     Description (location/character/radiation): big toe right foot, no trauma    Intensity:  Mild when not walking     Accompanying signs and symptoms: red, crusty, aches     History (similar episodes/previous evaluation): None    Precipitating or alleviating factors: None    Therapies tried and outcome: None     Right great toe nail - large and it is painful.     Not using any medication. Not using bp meds, cholesterol meds etc. Wishes not to use any medication.     Albuterol as needed but not using symbicort. Still smoking and not interested in quitting.     Problem list and histories reviewed & adjusted, as indicated.  Additional history: as documented    Labs reviewed in EPIC    Reviewed and updated as needed this visit by clinical staff  Tobacco  Allergies  Meds  Med Hx  Surg Hx  Fam Hx  Soc Hx      Reviewed and updated as needed this visit by Provider      Social History     Social History     Marital status:      Spouse name: Edenilson Montana     Number of children: 1     Years of education: N/A     Occupational History     Not on file.     Social History Main Topics     Smoking status: Current Every Day Smoker     Types: Cigarettes     Smokeless tobacco: Current User      Comment: 3 cigarettes per day      Alcohol use Yes      Comment: 2 drinks  weekly     Drug use: No     Sexual activity: Yes     Partners: Male     Other Topics Concern     Parent/Sibling W/ Cabg, Mi Or Angioplasty Before 65f 55m? No     Social History Narrative     Allergies   Allergen Reactions     Penicillins      Throat swelled up      Patient Active Problem List   Diagnosis     Allergic rhinitis     Tobacco use disorder     Insomnia     Esophageal reflux     Generalized osteoarthrosis, unspecified site     COPD (chronic obstructive pulmonary disease) (H)     Stress reaction of  "bone     Health Care Home     Back pain     Hiatal hernia     Advanced directives, counseling/discussion     Anxiety     CARDIOVASCULAR SCREENING; LDL GOAL LESS THAN 70     Acute right MCA stroke (H)     Intracranial vascular stenosis     Carotid disease, bilateral (H)     Mixed hyperlipidemia     Alcohol dependence in remission (H)     Hypertension goal BP (blood pressure) < 140/90     Reviewed medications, social history and  past medical and surgical history.    Review of system: for general, respiratory, CVS, GI and psychiatry negative except for noted above.     EXAM:  /72 (BP Location: Right arm, Patient Position: Sitting, Cuff Size: Adult Regular)  Pulse 78  Temp 96.6  F (35.9  C) (Tympanic)  Resp 16  Ht 4' 9.75\" (1.467 m)  Wt 94 lb 4 oz (42.8 kg)  LMP  (LMP Unknown)  SpO2 96%  BMI 19.87 kg/m2  Constitutional: healthy, alert and no distress   Psychiatric: mentation appears normal and affect normal/bright  Cardiovascular: RRR. No murmurs,  Respiratory: negative, Lungs clear. No crackles or wheezing. No tachypnea.   Toe nail thickened on right great toe.     ASSESSMENT / PLAN:  (J44.9) Chronic obstructive pulmonary disease, unspecified COPD type (H)  (primary encounter diagnosis)  Comment: ongoing smoking. Mostly asymptomatic as per patient. Not using symbicort. Understands risk but does not feel she needs it. Discussed about progressive disease.   Plan:       (F17.200) Tobacco use disorder  Comment: ongoing.   Plan: does not want intervention.     (I10) Hypertension goal BP (blood pressure) < 140/90  Comment:  bp is stable without meds.   Plan: OK to skip bp meds for now.     (Z86.73) History of right MCA stroke  Comment:  And was on bp meds and statin.   Plan: stopped. Does not think she needs it. Patient and her friend who is with her explained her multiple risk factors and she understood but still feels she does not need any meds.     (Z23) Need for prophylactic vaccination and inoculation " against influenza  Comment:    Plan: FLU VACCINE, INCREASED ANTIGEN, PRESV FREE, AGE        65+ [03378], Vaccine Administration, Initial         [88965]    Nail deformity  Comment - symptomatic treatment. If she has onychomycosis, she does not want intervention. Can see podiatrist if needed.            Declined rest of immunization and preventative aspects.       Follow up:  Yearly.     The above note was dictated using voice recognition. Although reviewed after completion, some word and grammatical error may remain .           Injectable Influenza Immunization Documentation    1.  Is the person to be vaccinated sick today?   No    2. Does the person to be vaccinated have an allergy to a component   of the vaccine?   No  Egg Allergy Algorithm Link    3. Has the person to be vaccinated ever had a serious reaction   to influenza vaccine in the past?   No    4. Has the person to be vaccinated ever had Guillain-Barré syndrome?   No    Form completed by Anni Stout CMA

## 2018-11-17 ASSESSMENT — ANXIETY QUESTIONNAIRES: GAD7 TOTAL SCORE: 0

## 2018-11-20 RX ORDER — TRAZODONE HYDROCHLORIDE 150 MG/1
TABLET ORAL
Qty: 270 TABLET | Refills: 1 | Status: SHIPPED | OUTPATIENT
Start: 2018-11-20

## 2018-11-20 RX ORDER — SIMVASTATIN 40 MG
40 TABLET ORAL AT BEDTIME
Qty: 90 TABLET | Refills: 3 | Status: SHIPPED | OUTPATIENT
Start: 2018-11-20

## 2018-12-03 ENCOUNTER — PATIENT OUTREACH (OUTPATIENT)
Dept: GERIATRIC MEDICINE | Facility: CLINIC | Age: 73
End: 2018-12-03

## 2018-12-03 NOTE — PROGRESS NOTES
[facility] Care Coordination Contact    CC received a call from [facility] stating client had called there and seemed confused about her services. She requested CC call client to answer any questions she may have. CC called client and she stated she was confused about a letter she received and thought she needed to enroll in the health plan again for the next year. She stated her questions were answered. CC again offered client home services to assist her with paperwork and errands. She declines at this time. She states she has a friend who is helping her when she needs to go to the store or to the doctor.   Marlin Gilbert RN  [facility] Care Coordinator  [phone]

## 2019-03-08 ENCOUNTER — PATIENT OUTREACH (OUTPATIENT)
Dept: GERIATRIC MEDICINE | Facility: CLINIC | Age: 74
End: 2019-03-08

## 2019-03-15 NOTE — PROGRESS NOTES
[facility] Care Coordination Contact    CC received another email from client's housing provider (Mary Pumphrey) asking if we heard from the ICLS provider. CC responded back that I have not heard anything but will call again. TM left for [facility] ([phone]) asking for a call back to discuss starting this service for client. Received a call back from Tim with [facility]. They are currently looking for a caregiver for client and think they have someone. They will be evaluating this employee next week and will get back with Marlin RUIZ when they have decided on a caregiver. Email to Mary Pumphrey informing her of this.    Jazmine Moreno, RN, BSN, PHN  [facility] Care Coordinator  [phone]

## 2019-05-03 ENCOUNTER — PATIENT OUTREACH (OUTPATIENT)
Dept: GERIATRIC MEDICINE | Facility: CLINIC | Age: 74
End: 2019-05-03

## 2019-05-03 NOTE — PROGRESS NOTES
[facility] Care Coordination Contact      [facility] Six-Month Telephone Assessment    6 month telephone assessment completed on 05/03/19.    ER visits: No  Hospitalizations: No  TCU stays: No  Significant health status changes: None  Falls/Injuries: No  ADL/IADL changes: No  Changes in services: No  CC has been trying to find ICL agency to help member. The first agency did not work out for her. A referral was placed with another agency, [facility] on 4/19. CC has left two messages with them but no return call. CC to follow up.     Caregiver Assessment follow up:  n/a    Goals: See POC in chart for goal progress documentation.      Will see member in 6 months for an annual health risk assessment.   Encouraged member to call CC with any questions or concerns in the meantime.   Marlin Gilbert RN  [facility] Care Coordinator  [phone]

## 2019-06-19 ENCOUNTER — PATIENT OUTREACH (OUTPATIENT)
Dept: GERIATRIC MEDICINE | Facility: CLINIC | Age: 74
End: 2019-06-19

## 2019-06-19 NOTE — PROGRESS NOTES
[facility] Care Coordination Contact    CC received notification that member's MA was inactive. CC called [facility] and was told member was over the asset limit by $400 but if she spent down to the allowable amount and sent in the receipts, she would be reinstated. CC called member and tried to explain this to her. She did not understand and couldn't figure out what to do. CC made an appointment to meet with member 6/20. CC also contacted Mary Pumphrey,  at member's apartment building and we will meet together to try and assist member.   Marlin Gilbert RN  [facility] Care Coordinator  [phone]

## 2019-06-20 ENCOUNTER — PATIENT OUTREACH (OUTPATIENT)
Dept: GERIATRIC MEDICINE | Facility: CLINIC | Age: 74
End: 2019-06-20

## 2019-06-21 NOTE — PROGRESS NOTES
[facility] Care Coordination Contact    CC met with member and Mary Pumphrey  at [facility] to assist with getting her MA reinstated. Member is over assets and doesn't understand she can only have $3000 in her account. Conference call placed with [facility] and member was informed she could spend down to $3000 and send the receipts. She can also have a separate account for burial. Member does not understand this and became very anxious during the visit. Dee will assist member with setting up a savings account with her bank to use as a burial account.  Marlin Gilbert RN  [facility] Care Coordinator  [phone]

## 2019-08-08 ENCOUNTER — PATIENT OUTREACH (OUTPATIENT)
Dept: GERIATRIC MEDICINE | Facility: CLINIC | Age: 74
End: 2019-08-08

## 2019-08-09 ASSESSMENT — ACTIVITIES OF DAILY LIVING (ADL): DEPENDENT_IADLS:: SHOPPING;TRANSPORTATION;MONEY MANAGEMENT

## 2019-08-09 NOTE — PROGRESS NOTES
[facility] Care Coordination Contact    [facility] Home Visit Assessment     Home visit for Health Risk Assessment with Digna Montana completed on August 8, 2019    Type of residence:: Apartment - handicap accessible  Current living arrangement:: I live alone          Current Care Plan  Member currently receiving the following home care services:   None  Member currently receiving the following community resources: None      Medication Review  Medication reconciliation completed in Epic: Yes  Medication set-up & administration: Independent-does not set up.  Self-administers medications.  Medication Risk Assessment Medication (1 or more, place referral to MTM): Lacks understanding of medication regimen  MTM Referral Placed: No: Declines    Mental/Behavioral Health   Depression Screening: See PHQ assessment flowsheet.   Mental health DX:: No      Mental Health Diagnosis: Yes: Anxiety  Mental Health Services: None: No further intervention needed at this time.    Falls Assessment:            ADL/IADL Dependencies:   Dependent ADLs:: Independent, Ambulation-no assistive device  Dependent IADLs:: Shopping, Transportation, Money Management    [facility] sponsored benefits: Shared information re: Silver Sneakers/gym memberships, ASA, Calcium +D.    PCA Assessment completed at visit: No     Elderly Waiver Eligibility: Yes-will continue on EW    Care Plan & Recommendations: Member will like to try home delivered meals. CC also met with member and Mary Pumphrey  at [facility] to discuss getting member back on MA by setting up a burial account. Dee talked with someone at the bank and member needs to have a current [place] ID in order to set up another account. She will attempt to find someone who is able to assist member with getting this done.     See [facility] for detailed assessment information.    Follow-Up Plan: Member informed of future contact, plan to f/u with member with a 6  month telephone assessment.  Contact information shared with member and family, encouraged member to call with any questions or concerns at any time.    [facility] continuum providers: Please refer to Health Care Home on the [facility] Problem List to view this patient's [facility] Care Plan Summary.  Marlin Gilbert RN  [facility] Care Coordinator  [phone]

## 2019-08-16 ENCOUNTER — PATIENT OUTREACH (OUTPATIENT)
Dept: GERIATRIC MEDICINE | Facility: CLINIC | Age: 74
End: 2019-08-16

## 2019-08-16 NOTE — PROGRESS NOTES
[facility] Care Coordination Contact    CC received a message from Mary Pumphrey  in member's building. She states member is not willing to ask her friend to take her to the DMV to get a new state ID. CC made a conference call with member and Angelica Pinto CHW with [facility] and asked member if she would be willing to have Angelica assist her with applying for a new ID. Member stated she is willing to have CHW assist. Angelica will find out what is needed for the application process and contact member next week.   Marlin Gilbert RN  [facility] Care Coordinator  [phone]

## 2019-08-21 ENCOUNTER — PATIENT OUTREACH (OUTPATIENT)
Dept: GERIATRIC MEDICINE | Facility: CLINIC | Age: 74
End: 2019-08-21

## 2019-08-22 ENCOUNTER — PATIENT OUTREACH (OUTPATIENT)
Dept: GERIATRIC MEDICINE | Facility: CLINIC | Age: 74
End: 2019-08-22

## 2019-08-22 NOTE — PROGRESS NOTES
[facility] Care Coordination Contact     CHW, followed up with member on ID . Member indicated she  was not feeling well had flu like symptoms. CHW, asked if she was okay member noted drinking 7 UP and tea and thinks she would be better tomorrow. CHW will follow up on 8/23    EASTON Escalante  [facility]  [phone].

## 2019-08-28 ENCOUNTER — PATIENT OUTREACH (OUTPATIENT)
Dept: GERIATRIC MEDICINE | Facility: CLINIC | Age: 74
End: 2019-08-28

## 2019-08-28 NOTE — PROGRESS NOTES
[facility] Care Coordination Contact     CHW, followed up with member on setting up time to connect. Member noted she was feeling better and was just getting around, not ready to set up time to connect and she would follow up with me.     CHW, called member back later in the day she again informed me she was  feeling better and just getting around. I explained to her if she felt better tomorrow and wanted to go and get ID updated to call and let me know. Member, then said not sure what I was talking about and she was sick and does not remember our conversations. I then reviewed and explained the importance of getting her ID updated the impact on her MA. Member said she would call back if feeling better.      EASTON Escalante  [facility]  [phone]

## 2019-09-04 ENCOUNTER — PATIENT OUTREACH (OUTPATIENT)
Dept: GERIATRIC MEDICINE | Facility: CLINIC | Age: 74
End: 2019-09-04

## 2019-09-04 NOTE — PROGRESS NOTES
[facility] Care Coordination Contact    No longer active with [facility] case management effective 08/31/19.  Reason for community disenrollment: MA Term because she was over the asset limit to qualify for MA. CC and the  at [facility], Dee Pumphrey, had been working with member to have her spend down her assets to qualify for MA by opening a burial account. Member did not understand this would effect her insurance benefits and she would not continue to pursue this. CC did make a [facility] report of self neglect. Case # [id number].  Marlin Gilbert RN  [facility] Care Coordinator  [phone]

## 2019-09-05 NOTE — PROGRESS NOTES
[facility] Care Coordination Contact    CC received a call back from Lila at [facility] ([phone]) to discuss member's situation. Lila had talked with member and also Mary Pumphrey  at member's apartment building. Lila stated member did not understand the need for spend down and told her she had no family to help. CC did explain that member has a daughter but she is not involved too much with member. [facility] also [facility]. Lila is going to contact the clinic to have them reach out to member to make an appointment for cognitive testing since she hasn't been seen in the clinic since 11/18. She will also talk with Mary Pumphrey again and also a friend of member's (Thu). Lila informed CC she is not sure if the case will be opened for further investigation but they do keep reports on file for 4 years so if there is another call, they will have this information available.  Marlin Gilbert RN  [facility] Care Coordinator  [phone]

## 2019-09-05 NOTE — TELEPHONE ENCOUNTER
Noted.  We will ask our  if they can get involved and help her to schedule an appointment and provide resources for if she is struggling with dementia

## 2019-09-10 ENCOUNTER — PATIENT OUTREACH (OUTPATIENT)
Dept: CARE COORDINATION | Facility: CLINIC | Age: 74
End: 2019-09-10

## 2019-09-10 NOTE — PROGRESS NOTES
Clinic Care Coordination Contact  [facility]/Voicemail    Patient had MA and was a member of [employer] but then her MA lapsed. [employer] CC was concerned as patient appeared to not understand the spend down or that she was over the asset limit. Patient was on the EW but at this time is not.  [facility] CC made a referral to APS. Lila with APS contacted the clinic and requested involvement from clinic CC. Patient has not made an appointment with the clinic as of CC call.     Lila stated:  1. Patient is having difficulty managing finances              A. If/when patient sees PCP, would be ideal for PCP to contact social security to submit a statement of need for patient to have a representative payee to help manage finances              I. If/when patient is in for office visit and there are any concerns, feel free to file a vulnerable adult report              B. Patient needs to spend down in order to qualify for medical assistance and patient does not like/seem to understand need to spend down  2. Patient missed several months of rent and had to be shown missed payments before she paid  3. Patient is taking care of cat and apartment              A. Patient does allow food to spoil in fridge but friends take her grocery shopping.    Clinical Data: Care Coordinator Outreach  Outreach attempted x 1.  Left message on patient's voicemail with call back information and requested return call.  Plan: Care Coordinator will try to reach patient again in 1-2 business days.    Rebeca So, ZEYAD   [facility] - Care Coordination  [place], [place], & [facility]  [phone]

## 2019-09-10 NOTE — LETTER
[place] CARE COORDINATION    September 17, 2019    Digna Montana  [address]   [place] [address]      Dear Digna,    I am a clinic care coordinator who works with Simon Madden MD at the [facility]. I recently tried to call and was unable to reach you. I wanted to introduce myself and provide you with my contact information so that you can call me with questions or concerns about your health care. Below is a description of clinic care coordination and how I can further assist you.     The clinic care coordinator is a registered nurse and/or  who understand the health care system. The goal of clinic care coordination is to help you manage your health and improve access to the [facility] system in the most efficient manner. The registered nurse can assist you in meeting your health care goals by providing education, coordinating services, and strengthening the communication among your providers. The  can assist you with financial, behavioral, psychosocial, chemical dependency, counseling, and/or psychiatric resources.    Please feel free to contact me at [phone], with any questions or concerns. We at [facility] are focused on providing you with the highest-quality healthcare experience possible and that all starts with you.     Sincerely,     BLANCA Swain

## 2019-09-16 NOTE — PROGRESS NOTES
Clinic Care Coordination Contact  [facility]/Voicemail    Patient had MA and was a member of [employer] but then her MA lapsed. [facility] CC was concerned as patient appeared to not understand the spend down or that she was over the asset limit. Patient was on the EW but at this time is not.  [facility] CC made a referral to APS. Lila with APS contacted the clinic and requested involvement from clinic CC. Patient has not made an appointment with the clinic as of CC call.     Lila stated:  1. Patient is having difficulty managing finances              A. If/when patient sees PCP, would be ideal for PCP to contact social security to submit a statement of need for patient to have a representative payee to help manage finances              I. If/when patient is in for office visit and there are any concerns, feel free to file a vulnerable adult report              B. Patient needs to spend down in order to qualify for medical assistance and patient does not like/seem to understand need to spend down  2. Patient missed several months of rent and had to be shown missed payments before she paid  3. Patient is taking care of cat and apartment              A. Patient does allow food to spoil in fridge but friends take her grocery shopping.    Clinical Data: Care Coordinator Outreach  Outreach attempted x 2.  Left message on patient's voicemail with call back information and requested return call.  Plan: Care Coordinator will mail out CC introduction letter. CC will do no further outreach at this time.     Rebeca So, [facility] - Care Coordination  [place], [place], & [facility]  [phone]      KEENA outreached and spoke with Lila the APS worker who investigated this case to provide an update. The case was not opened. Lila states if there are new concerns to make a new report. KEENA discussed that there aren't any known new concerns as patient has been unable to reach.     KEENA outreached to Mary Pumphrey  with [facility] communities where patient resides and left voicemail message. KEENA received call back from Dee. SW provided update that SW has been unable to reach patient and patient has not scheduled a follow-up appointment. Dee states she will provide patient with  phone number and will encourage her to call. KEENA encouraged Dee to make a new APS report if she has ongoing concerns.     Rebeca So, ZEYAD   [facility] - Care Coordination  [place], [place], & [facility]  [phone]

## 2021-11-29 NOTE — PROGRESS NOTES
Patient Education   Dx: Hip pain, Osteoarthritis  Osteoarthritis  Osteoarthritis happens when the cartilage in a joint becomes damaged and worn. This may be from age, wear and tear, overuse of the joint, obesity, or other problems. Osteoarthritis can affect any joint. But it's most common in hands, knees, spine, hips, and feet. Symptoms include joint stiffness, and pain. It's also called degenerative joint disease.   Home care  · When a joint is more sore than usual, rest it for a day or two.  · Heat can help relieve stiffness. Take a hot bath or apply a heating pad for up to 30 minutes at a time. If symptoms are worse in the morning, using heat just after awakening can help relax the muscle and soothe the joints.   · Ice helps relieve pain. It's often used after activity. Use a cold pack wrapped in a thin cloth on the joint for 10 to 15 minutes at a time.   · Alternating hot and cold can also help relieve pain. Try this for 20 minutes at a time, several times per day.  · Exercise helps prevent the muscles and ligaments around the joint from becoming weak. It also helps maintain function in the joint. Be as active as you can. Talk to your healthcare provider about what activity program is best for you.  · Excess weight puts a lot of extra strain on weight-bearing joints of the lower back, hips, knees, feet and ankles. If you are overweight, talk to your healthcare provider about a safe and effective weight loss program.  · Use anti-inflammatory medicines as prescribed for pain. This includes acetaminophen or NSAIDs such as ibuprofen or naproxen. Don't take NSAIDs if your healthcare provider has told you that you can't take NSAIDS because of other health problems If needed, topical or injected medicines may be recommended. Talk with your healthcare provider if these options are not enough to manage your pain. Follow the directions on all over-the-counter medicines.  · Talk with your healthcare provider about devices  [facility] Care Coordination Contact    Email from client's housing provider (Mary Pumphrey) stating that client would like to get the ICLS service that she originally didn't know if she wanted. TM left for [facility] ([phone]) asking for a call back to discuss starting this service for client. Emailed Dee back informing her of this and providing CC's contact information.  Jazmine Moreno RN, BSN, PHN  [facility] Care Coordinator  [phone]       that might help improve your function and reduce pain.  · Talk with you healthcare provider about physical therapy to help strengthen your joints and the surrounding muscles.    Follow-up care  Follow up with your healthcare provider, or as advised.   When to seek medical advice  Call your healthcare provider right away if any of these occur:  · Redness or swelling of a painful joint  · Discharge or pus from a painful joint  · Fever of 100.4ºF (38ºC) or higher, or as directed by your healthcare provider  · Worsening joint pain  · Decreased ability to move the joint or bear weight on the joint  Derek last reviewed this educational content on 8/1/2019  © 8746-5731 [employer]. All rights reserved. This information is not intended as a substitute for professional medical care. Always follow your healthcare professional's instructions.